# Patient Record
Sex: MALE | Race: WHITE | NOT HISPANIC OR LATINO | Employment: FULL TIME | URBAN - NONMETROPOLITAN AREA
[De-identification: names, ages, dates, MRNs, and addresses within clinical notes are randomized per-mention and may not be internally consistent; named-entity substitution may affect disease eponyms.]

---

## 2018-05-04 ENCOUNTER — HOSPITAL ENCOUNTER (OUTPATIENT)
Dept: INFUSION THERAPY | Facility: HOSPITAL | Age: 63
Discharge: HOME | End: 2018-05-04
Attending: SURGERY | Admitting: SURGERY

## 2018-05-04 ENCOUNTER — TRANSCRIBE ORDERS (OUTPATIENT)
Dept: INFUSION THERAPY | Facility: HOSPITAL | Age: 63
End: 2018-05-04

## 2018-05-04 VITALS
HEART RATE: 95 BPM | DIASTOLIC BLOOD PRESSURE: 75 MMHG | SYSTOLIC BLOOD PRESSURE: 128 MMHG | TEMPERATURE: 98.5 F | RESPIRATION RATE: 20 BRPM

## 2018-05-04 DIAGNOSIS — L03.818 CELLULITIS OF OTHER SPECIFIED SITE: Primary | ICD-10-CM

## 2018-05-04 DIAGNOSIS — L03.818 CELLULITIS OF OTHER SPECIFIED SITE: ICD-10-CM

## 2018-05-04 PROCEDURE — 25010000002 DALBAVANCIN 500 MG RECONSTITUTED SOLUTION 1 EACH VIAL: Performed by: SURGERY

## 2018-05-04 PROCEDURE — 96365 THER/PROPH/DIAG IV INF INIT: CPT

## 2018-05-04 RX ORDER — CEFDINIR 300 MG/1
300 CAPSULE ORAL 2 TIMES DAILY
Qty: 28 CAPSULE | Refills: 1 | Status: CANCELLED | OUTPATIENT
Start: 2018-05-04 | End: 2018-05-18

## 2018-05-04 RX ADMIN — DALBAVANCIN 1500 MG: 500 INJECTION, POWDER, FOR SOLUTION INTRAVENOUS at 16:02

## 2018-05-04 NOTE — PATIENT INSTRUCTIONS
Dalbavancin injection  What is this medicine?  DALBAVANCIN (DIYA ba van sin) is an antibiotic. It is used to treat certain kinds of bacterial infections. It will not work for colds, flu, or other viral infections.  This medicine may be used for other purposes; ask your health care provider or pharmacist if you have questions.  COMMON BRAND NAME(S): DALVANCE  What should I tell my health care provider before I take this medicine?  They need to know if you have any of these conditions:  -intestine problems, like colitis  -an unusual or allergic reaction to dalbavancin, other medicines, foods, dyes, or preservatives  -pregnant or trying to get pregnant  -breast-feeding  How should I use this medicine?  This medicine is infused into a vein. It is usually given by a health care professional in a hospital or clinic setting.  If you get this medicine at home, you will be taught how to prepare and give this medicine. Use exactly as directed. Take your medicine at regular intervals. Do not take your medicine more often than directed.  It is important that you put your used needles and syringes in a special sharps container. Do not put them in a trash can. If you do not have a sharps container, call your pharmacist or healthcare provider to get one.  Talk to your pediatrician regarding the use of this medicine in children. Special care may be needed.  Overdosage: If you think you have taken too much of this medicine contact a poison control center or emergency room at once.  NOTE: This medicine is only for you. Do not share this medicine with others.  What if I miss a dose?  It is important not to miss your dose. Call your doctor or health care professional if you are unable to keep an appointment. If you give yourself the medicine and you miss a dose, take it as soon as you can. If it is almost time for your next dose, take only that dose. Do not take double or extra doses.  What may interact with this medicine?  This  medicine may interact with the following medications:  -birth control pills  This list may not describe all possible interactions. Give your health care provider a list of all the medicines, herbs, non-prescription drugs, or dietary supplements you use. Also tell them if you smoke, drink alcohol, or use illegal drugs. Some items may interact with your medicine.  What should I watch for while using this medicine?  Tell your doctor or healthcare professional if your symptoms do not start to get better or if they get worse.  Do not treat diarrhea with over the counter products. Contact your doctor if you have diarrhea that lasts more than 2 days or if it is severe and watery.  What side effects may I notice from receiving this medicine?  Side effects that you should report to your doctor or health care professional as soon as possible:  -allergic reactions like skin rash, itching or hives, swelling of the face, lips, or tongue  -bloody or watery diarrhea  Side effects that usually do not require medical attention (report these to your doctor or health care professional if they continue or are bothersome):  -diarrhea  -headache  -nausea, vomiting  This list may not describe all possible side effects. Call your doctor for medical advice about side effects. You may report side effects to FDA at 2-235-FDA-1392.  Where should I keep my medicine?  Keep out of the reach of children.  This drug is usually given in a hospital or clinic and will not be stored at home. In rare cases, this medicine may be given at home.  If you are using this medicine at home, you will be instructed on how to store this medicine. Throw away any unused medicine after the expiration date on the label.  NOTE: This sheet is a summary. It may not cover all possible information. If you have questions about this medicine, talk to your doctor, pharmacist, or health care provider.  © 2015, [employer]. (2014-06-02 12:44:30)

## 2018-05-06 ENCOUNTER — ANESTHESIA (OUTPATIENT)
Dept: PERIOP | Facility: HOSPITAL | Age: 63
End: 2018-05-06

## 2018-05-06 ENCOUNTER — ANESTHESIA EVENT (OUTPATIENT)
Dept: PERIOP | Facility: HOSPITAL | Age: 63
End: 2018-05-06

## 2018-05-06 ENCOUNTER — HOSPITAL ENCOUNTER (OUTPATIENT)
Facility: HOSPITAL | Age: 63
Discharge: HOME | End: 2018-05-06
Attending: SURGERY | Admitting: SURGERY

## 2018-05-06 ENCOUNTER — TREATMENT (OUTPATIENT)
Dept: SURGERY | Facility: CLINIC | Age: 63
End: 2018-05-06

## 2018-05-06 VITALS
DIASTOLIC BLOOD PRESSURE: 84 MMHG | TEMPERATURE: 99.2 F | RESPIRATION RATE: 14 BRPM | HEART RATE: 81 BPM | SYSTOLIC BLOOD PRESSURE: 121 MMHG | OXYGEN SATURATION: 96 %

## 2018-05-06 DIAGNOSIS — L03.116 CELLULITIS OF LEFT LOWER EXTREMITY: Primary | ICD-10-CM

## 2018-05-06 PROBLEM — L03.032 CELLULITIS OF GREAT TOE, LEFT: Status: ACTIVE | Noted: 2018-05-06

## 2018-05-06 PROCEDURE — 11042 DBRDMT SUBQ TIS 1ST 20SQCM/<: CPT | Performed by: SURGERY

## 2018-05-06 RX ORDER — MAGNESIUM HYDROXIDE 1200 MG/15ML
LIQUID ORAL AS NEEDED
Status: DISCONTINUED | OUTPATIENT
Start: 2018-05-06 | End: 2018-05-06 | Stop reason: HOSPADM

## 2018-05-06 NOTE — ANESTHESIA PROCEDURE NOTES
Peripheral Block    Patient location during procedure: pre-op  Start time: 5/6/2018 4:40 PM  Stop time: 5/6/2018 4:55 PM  Reason for block: procedure for pain and at surgeon's request  Performed by  Anesthesiologist: LYSSA ALLISON  CRNA: TIKA FROST  Preanesthetic Checklist  Completed: patient identified, site marked, surgical consent, pre-op evaluation, timeout performed, IV checked, risks and benefits discussed and monitors and equipment checked  Prep:  Pt Position: supine  Sterile barriers:cap, gloves and sterile barriers  Prep: ChloraPrep  Patient monitoring: blood pressure monitoring, continuous pulse oximetry and EKG  Procedure  Sedation:no  Performed under: MAC  Guidance:landmark technique    Laterality:left  Block Type:ankle  Injection Technique:single-shotNeedle Gauge:27 G  Resistance on Injection: less than 15 psi  Medications  Local Injected:bupivacaine 0.5% and lidocaine 2% Local Amount Injected:30mL  Post Assessment  Injection Assessment: negative aspiration for heme, no paresthesia on injection and incremental injection  Patient Tolerance:comfortable throughout block  Complications:no

## 2018-05-06 NOTE — ANESTHESIA POSTPROCEDURE EVALUATION
Patient: Uriah Torres    Procedure Summary     Date:  05/06/18 Room / Location:  [facility] /  [facility]    Anesthesia Start:  1712 Anesthesia Stop:  1727    Procedure:  I&D LEFT GREAT TOE (Left Arm Upper) Diagnosis:       Cellulitis of left lower extremity      (Cellulitis of left lower extremity [L03.116])    Surgeon:  Juancarlos Cortez MD Provider:  Parviz Berkowitz DO    Anesthesia Type:  MAC, regional ASA Status:  2          Anesthesia Type: MAC, regional  Last vitals  BP   128/88 (05/06/18 1630)   Temp   97.1 °F (36.2 °C) (05/06/18 1630)   Pulse   79 (05/06/18 1630)   Resp   20 (05/06/18 1630)     SpO2   95 % (05/06/18 1630)     Anesthesia Post Evaluation

## 2018-05-06 NOTE — OP NOTE
Uriah Engels  5/6/2018      Operative Progress Note:    Surgeon and Assistant: Dr. Cortez    Pre-Operative Diagnosis: Contusion of left giant toe with damage to nail    Post-Operative Diagnosis: Contusion of left giant toe with damage to nail    Procedure(s):  debridement left giant toe    Type of Anesthesia Administered: ankle block per anesthesia    Estimated Blood Loss: Minimal    Blood Products: None    Specimen Obtained/Removed:None    Complication(s):  None    Graft/Implant/Prosthetics/Implanted Device/Transplants: None    Indication: patient is a 63-year-old white    Findings: small amount purulence underneath the nail bed    Operative Report:  Patient taken operating room laid no spine position on operating table.  Ankle block was performed by anesthesia in the holding room.  Left foot was prepped draped usual sterile fashion.  I elevated the nail fold proximally there is a minimal amount of purulence.  I debrided some dried skin in the area.  There is no evidence of fluctuance.  Sterile dressing was applied.  Procedure terminated.  Patient thought procedure very well was returned to the recovery room in satisfactory condition.    Patient will be discharged home at recovery and will be seen back in the office in one week.       Electronically Signed by: Juancarlos Cortez MD        Dictated Utilizing Dragon Dictation

## 2018-05-06 NOTE — ANESTHESIA POSTPROCEDURE EVALUATION
Patient: Uriah Torres    Procedure Summary     Date:  05/06/18 Room / Location:  [facility] OR 01 / [facility]    Anesthesia Start:  1712 Anesthesia Stop:  1727    Procedure:  I&D LEFT GREAT TOE (Left Arm Upper) Diagnosis:       Cellulitis of left lower extremity      (Cellulitis of left lower extremity [L03.116])    Surgeon:  Juancarlos Cortez MD Provider:  Parviz Berkowitz DO    Anesthesia Type:  MAC, regional ASA Status:  2          Anesthesia Type: MAC, regional  Last vitals  BP   128/88 (05/06/18 1630)   Temp   97.1 °F (36.2 °C) (05/06/18 1630)   Pulse   79 (05/06/18 1630)   Resp   20 (05/06/18 1630)     SpO2   95 % (05/06/18 1630)     Post Anesthesia Care and Evaluation    Patient location during evaluation: PACU  Patient participation: complete - patient participated  Level of consciousness: awake and alert  Pain score: 0  Pain management: adequate  Airway patency: patent  Anesthetic complications: No anesthetic complications  PONV Status: none  Cardiovascular status: acceptable and stable  Respiratory status: acceptable  Hydration status: acceptable  No anesthesia care post op

## 2018-05-06 NOTE — ANESTHESIA PREPROCEDURE EVALUATION
Anesthesia Evaluation     Patient summary reviewed and Nursing notes reviewed   no history of anesthetic complications:  NPO Solid Status: > 4 hours  NPO Liquid Status: > 4 hours           Airway   Mallampati: II  TM distance: >3 FB  Neck ROM: full  no difficulty expected  Dental - normal exam     Pulmonary - negative pulmonary ROS and normal exam    breath sounds clear to auscultation  Cardiovascular - negative cardio ROS and normal exam  Exercise tolerance: excellent (>7 METS)    Rhythm: regular  Rate: normal        Neuro/Psych- negative ROS  GI/Hepatic/Renal/Endo - negative ROS     Musculoskeletal     (+) joint swelling,   Abdominal  - normal exam    Bowel sounds: normal.   Substance History - negative use     OB/GYN negative ob/gyn ROS         Other                        Anesthesia Plan    ASA 2     MAC and regional   (Ankle Block)  Anesthetic plan and risks discussed with patient.    Plan discussed with CRNA.

## 2018-05-06 NOTE — PLAN OF CARE
Problem: Infection, Risk/Actual (Adult)  Goal: Identify Related Risk Factors and Signs and Symptoms  Outcome: Ongoing (interventions implemented as appropriate)      Problem: Pain, Acute (Adult)  Goal: Identify Related Risk Factors and Signs and Symptoms  Outcome: Ongoing (interventions implemented as appropriate)

## 2018-05-06 NOTE — PROGRESS NOTES
Chief complaint: infected left giant toe    HPI: patient's a 63-year-old white male who developed paronychia of his left first toe.  2 days ago I removed the toenail.  He has some persistent fluctuance in this area        Review of Systems:    Constitutional: denies any weight changes, fatigue or weakness.  Eyes: : denies blurred or double vision  Cardiovascular: denies chest pain, palpitations, edemas.  Respiratory: denies cough, sputum, SOB.  Gastrointestinal: denies N&V, abd pain, diarrhea, constipation.  Genitourinary: denies dysuria, frequency.  Endocrine: denies cold intolerance, lethargy and flushing.  Hem: denies excessive bruising and postop bleeding.  Musculoskeletal: denies weakness, joint swelling, pain or stiffness.  Neuro: denies seizures, CVA, paresthesia, or peripheral neuropathy.   Skin: denies change in nevi, rashes, masses.      History  Past Medical History:   Diagnosis Date   • Lactose intolerance in adult      Past Surgical History:   Procedure Laterality Date   • APPENDECTOMY     • COLONOSCOPY       No family history on file.  Social History   Substance Use Topics   • Smoking status: Never Smoker   • Smokeless tobacco: Never Used   • Alcohol use No       (Not in a hospital admission)  Allergies:  Review of patient's allergies indicates no known allergies.    Objective     Vital Signs       Physical Exam:      General Appearance:    Alert, cooperative, in no acute distress   Head:    Normocephalic, without obvious abnormality, atraumatic   Eyes:            Lids and lashes normal, conjunctivae and sclerae normal, no   icterus, no pallor, corneas clear, PERRLA   Ears:    Ears appear intact with no abnormalities noted   Throat:   No oral lesions, no thrush, oral mucosa moist   Neck:   No adenopathy, supple, trachea midline, no thyromegaly, no   carotid bruit, no JVD   Back:     No kyphosis present, no scoliosis present, no skin lesions,      erythema or scars, no tenderness to percussion or                    palpation,   range of motion normal   Lungs:     Clear to auscultation,respirations regular, even and                  unlabored    Heart:    Regular rhythm and normal rate, normal S1 and S2, no            murmur, no gallop, no rub, no click   Chest Wall:    No abnormalities observed   Abdomen:    Solved    Rectal:     Deferred   Extremities: Left giant toe appears to have some fluctuance just proximal to the nail bed.  Nail has been removed   Pulses:   Pulses palpable and equal bilaterally   Skin:   No bleeding, bruising or rash   Lymph nodes:   No palpable adenopathy   Neurologic:   Cranial nerves 2 - 12 grossly intact, sensation intact, DTR       present and equal bilaterally       No results found for: GLUCOSE, BUN, CREATININE, EGFRIFNONA, EGFRIFAFRI, BCR, CO2, CALCIUM, PROTENTOTREF, ALBUMIN, LABIL2, AST, ALT  No results found for: WBC, RBC, HGB, HCT, MCV, MCH, MCHC, RDW, RDWSD, MPV, PLT, NEUTRORELPCT, LYMPHORELPCT, MONORELPCT, EOSRELPCT, BASORELPCT, AUTOIGPER, NEUTROABS, LYMPHSABS, MONOSABS, EOSABS, BASOSABS, AUTOIGNUM, NRBC    Imaging Results (last 72 hours)     ** No results found for the last 72 hours. **             Assessment  Abscess left giant toe   Plan  Incision and drainage           Juancarlos Cortez MD  05/06/18  4:07 PM      Dragon disclaimer:  Much of this encounter note is an electronic transcription/translation of spoken language to printed text. The electronic translation of spoken language may permit erroneous, or at times, nonsensical words or phrases to be inadvertently transcribed; Although I have reviewed the note for such errors, some may still exist.    This document signed by Juancarlos Cortez MD May 6, 2018 4:07 PM

## 2018-05-11 NOTE — H&P
Patient:    Uriah Torres  :  1955    Chief complaint: infected left giant toe     HPI: patient's a 63-year-old white male who developed paronychia of his left first toe.  2 days ago I removed the toenail.  He has some persistent fluctuance in this area           Review of Systems:     Constitutional: denies any weight changes, fatigue or weakness.  Eyes: : denies blurred or double vision  Cardiovascular: denies chest pain, palpitations, edemas.  Respiratory: denies cough, sputum, SOB.  Gastrointestinal: denies N&V, abd pain, diarrhea, constipation.  Genitourinary: denies dysuria, frequency.  Endocrine: denies cold intolerance, lethargy and flushing.  Hem: denies excessive bruising and postop bleeding.  Musculoskeletal: denies weakness, joint swelling, pain or stiffness.  Neuro: denies seizures, CVA, paresthesia, or peripheral neuropathy.   Skin: denies change in nevi, rashes, masses.        History  Medical History        Past Medical History:   Diagnosis Date   • Lactose intolerance in adult           Surgical History         Past Surgical History:   Procedure Laterality Date   • APPENDECTOMY       • COLONOSCOPY             No family history on file.       Social History   Substance Use Topics   • Smoking status: Never Smoker   • Smokeless tobacco: Never Used   • Alcohol use No        Prescriptions Prior to Admission      (Not in a hospital admission)     Allergies:  Review of patient's allergies indicates no known allergies.        Objective         Vital Signs     Physical Exam:                 General Appearance:    Alert, cooperative, in no acute distress   Head:    Normocephalic, without obvious abnormality, atraumatic   Eyes:            Lids and lashes normal, conjunctivae and sclerae normal, no   icterus, no pallor, corneas clear, PERRLA   Ears:    Ears appear intact with no abnormalities noted   Throat:   No oral lesions, no thrush, oral mucosa moist   Neck:   No adenopathy, supple, trachea  midline, no thyromegaly, no   carotid bruit, no JVD   Back:     No kyphosis present, no scoliosis present, no skin lesions,      erythema or scars, no tenderness to percussion or                   palpation,   range of motion normal   Lungs:     Clear to auscultation,respirations regular, even and                  unlabored    Heart:    Regular rhythm and normal rate, normal S1 and S2, no            murmur, no gallop, no rub, no click   Chest Wall:    No abnormalities observed   Abdomen:    Solved    Rectal:     Deferred   Extremities: Left giant toe appears to have some fluctuance just proximal to the nail bed.  Nail has been removed   Pulses:   Pulses palpable and equal bilaterally   Skin:   No bleeding, bruising or rash   Lymph nodes:   No palpable adenopathy   Neurologic:   Cranial nerves 2 - 12 grossly intact, sensation intact, DTR       present and equal bilaterally         No results found for: GLUCOSE, BUN, CREATININE, EGFRIFNONA, EGFRIFAFRI, BCR, CO2, CALCIUM, PROTENTOTREF, ALBUMIN, LABIL2, AST, ALT  No results found for: WBC, RBC, HGB, HCT, MCV, MCH, MCHC, RDW, RDWSD, MPV, PLT, NEUTRORELPCT, LYMPHORELPCT, MONORELPCT, EOSRELPCT, BASORELPCT, AUTOIGPER, NEUTROABS, LYMPHSABS, MONOSABS, EOSABS, BASOSABS, AUTOIGNUM, NRBC         Imaging Results (last 72 hours)      ** No results found for the last 72 hours. **                           Assessment  Abscess left giant toe   Plan  Incision and drainage

## 2018-05-15 ENCOUNTER — LAB (OUTPATIENT)
Dept: LAB | Facility: HOSPITAL | Age: 63
End: 2018-05-15
Attending: SURGERY

## 2018-05-15 DIAGNOSIS — Z51.89 VISIT FOR WOUND CHECK: Primary | ICD-10-CM

## 2018-05-15 DIAGNOSIS — Z51.89 VISIT FOR WOUND CHECK: ICD-10-CM

## 2018-05-15 DIAGNOSIS — L03.116 CELLULITIS OF LEFT LOWER EXTREMITY: ICD-10-CM

## 2018-05-15 PROCEDURE — 85007 BL SMEAR W/DIFF WBC COUNT: CPT | Performed by: SURGERY

## 2018-05-15 PROCEDURE — 36415 COLL VENOUS BLD VENIPUNCTURE: CPT

## 2018-05-15 PROCEDURE — 80053 COMPREHEN METABOLIC PANEL: CPT | Performed by: SURGERY

## 2018-05-15 PROCEDURE — 85025 COMPLETE CBC W/AUTO DIFF WBC: CPT | Performed by: SURGERY

## 2018-05-15 PROCEDURE — 86140 C-REACTIVE PROTEIN: CPT | Performed by: SURGERY

## 2018-05-16 LAB
ALBUMIN SERPL-MCNC: 4.4 G/DL (ref 3.4–4.8)
ALBUMIN/GLOB SERPL: 1.4 G/DL (ref 1.5–2.5)
ALP SERPL-CCNC: 79 U/L (ref 40–129)
ALT SERPL W P-5'-P-CCNC: 37 U/L (ref 10–44)
ANION GAP SERPL CALCULATED.3IONS-SCNC: 9.5 MMOL/L (ref 3.6–11.2)
AST SERPL-CCNC: 31 U/L (ref 10–34)
BILIRUB SERPL-MCNC: 0.5 MG/DL (ref 0.2–1.8)
BUN BLD-MCNC: 18 MG/DL (ref 7–21)
BUN/CREAT SERPL: 15.9 (ref 7–25)
BURR CELLS BLD QL SMEAR: NORMAL
CALCIUM SPEC-SCNC: 8.9 MG/DL (ref 7.7–10)
CHLORIDE SERPL-SCNC: 106 MMOL/L (ref 99–112)
CO2 SERPL-SCNC: 21.5 MMOL/L (ref 24.3–31.9)
CREAT BLD-MCNC: 1.13 MG/DL (ref 0.43–1.29)
CRP SERPL-MCNC: 0.77 MG/DL (ref 0–0.99)
DEPRECATED RDW RBC AUTO: 40.4 FL (ref 37–54)
EOSINOPHIL # BLD MANUAL: 0.06 10*3/MM3 (ref 0–0.7)
EOSINOPHIL NFR BLD MANUAL: 1 % (ref 0–5)
ERYTHROCYTE [DISTWIDTH] IN BLOOD BY AUTOMATED COUNT: 14.6 % (ref 11.5–14.5)
GFR SERPL CREATININE-BSD FRML MDRD: 66 ML/MIN/1.73
GLOBULIN UR ELPH-MCNC: 3.2 GM/DL
GLUCOSE BLD-MCNC: 156 MG/DL (ref 70–110)
HCT VFR BLD AUTO: 44.1 % (ref 42–52)
HGB BLD-MCNC: 14.2 G/DL (ref 14–18)
LARGE PLATELETS: NORMAL
LYMPHOCYTES # BLD MANUAL: 2.06 10*3/MM3 (ref 1–3)
LYMPHOCYTES NFR BLD MANUAL: 34 % (ref 21–51)
LYMPHOCYTES NFR BLD MANUAL: 8 % (ref 0–10)
MCH RBC QN AUTO: 25.1 PG (ref 27–33)
MCHC RBC AUTO-ENTMCNC: 32.2 G/DL (ref 33–37)
MCV RBC AUTO: 77.9 FL (ref 80–94)
MONOCYTES # BLD AUTO: 0.48 10*3/MM3 (ref 0.1–0.9)
NEUTROPHILS # BLD AUTO: 3.45 10*3/MM3 (ref 1.4–6.5)
NEUTROPHILS NFR BLD MANUAL: 57 % (ref 30–70)
OSMOLALITY SERPL CALC.SUM OF ELEC: 278.9 MOSM/KG (ref 273–305)
PLATELET # BLD AUTO: 206 10*3/MM3 (ref 130–400)
PMV BLD AUTO: 10.6 FL (ref 6–10)
POIKILOCYTOSIS BLD QL SMEAR: NORMAL
POTASSIUM BLD-SCNC: 3.8 MMOL/L (ref 3.5–5.3)
PROT SERPL-MCNC: 7.6 G/DL (ref 6–8)
RBC # BLD AUTO: 5.66 10*6/MM3 (ref 4.7–6.1)
SCAN SLIDE: NORMAL
SODIUM BLD-SCNC: 137 MMOL/L (ref 135–153)
WBC NRBC COR # BLD: 6.06 10*3/MM3 (ref 4.5–12.5)

## 2018-08-28 PROCEDURE — 36415 COLL VENOUS BLD VENIPUNCTURE: CPT

## 2018-08-28 PROCEDURE — 87522 HEPATITIS C REVRS TRNSCRPJ: CPT

## 2018-08-29 ENCOUNTER — LAB (OUTPATIENT)
Dept: LAB | Facility: HOSPITAL | Age: 63
End: 2018-08-29

## 2018-08-29 ENCOUNTER — TRANSCRIBE ORDERS (OUTPATIENT)
Dept: ADMINISTRATIVE | Facility: HOSPITAL | Age: 63
End: 2018-08-29

## 2018-08-29 DIAGNOSIS — IMO0002 NEEDLE STICK INJURY: ICD-10-CM

## 2018-08-29 DIAGNOSIS — IMO0002 NEEDLE STICK INJURY: Primary | ICD-10-CM

## 2018-08-29 LAB
ALT SERPL W P-5'-P-CCNC: 31 U/L (ref 10–44)
AST SERPL-CCNC: 29 U/L (ref 10–34)
HCV AB SER DONR QL: NORMAL

## 2018-08-29 PROCEDURE — 84460 ALANINE AMINO (ALT) (SGPT): CPT

## 2018-08-29 PROCEDURE — 86803 HEPATITIS C AB TEST: CPT

## 2018-08-29 PROCEDURE — 84450 TRANSFERASE (AST) (SGOT): CPT

## 2018-08-29 PROCEDURE — 36415 COLL VENOUS BLD VENIPUNCTURE: CPT

## 2018-09-03 LAB
HCV RNA SERPL NAA+PROBE-ACNC: NORMAL IU/ML
TEST INFORMATION: NORMAL

## 2018-09-21 ENCOUNTER — LAB REQUISITION (OUTPATIENT)
Dept: LAB | Facility: HOSPITAL | Age: 63
End: 2018-09-21

## 2018-09-21 DIAGNOSIS — Z00.00 ROUTINE GENERAL MEDICAL EXAMINATION AT A HEALTH CARE FACILITY: ICD-10-CM

## 2018-09-21 LAB
ALT SERPL W P-5'-P-CCNC: 29 U/L (ref 10–44)
HBV SURFACE AG SERPL QL IA: NORMAL
HCV AB SER DONR QL: NORMAL
HIV1+2 AB SER QL: NORMAL

## 2018-09-21 PROCEDURE — G0432 EIA HIV-1/HIV-2 SCREEN: HCPCS | Performed by: FAMILY MEDICINE

## 2018-09-21 PROCEDURE — 86803 HEPATITIS C AB TEST: CPT | Performed by: FAMILY MEDICINE

## 2018-09-21 PROCEDURE — 86706 HEP B SURFACE ANTIBODY: CPT | Performed by: FAMILY MEDICINE

## 2018-09-21 PROCEDURE — 87340 HEPATITIS B SURFACE AG IA: CPT | Performed by: FAMILY MEDICINE

## 2018-09-21 PROCEDURE — 84460 ALANINE AMINO (ALT) (SGPT): CPT | Performed by: FAMILY MEDICINE

## 2018-09-22 LAB — HBV SURFACE AB SER RIA-ACNC: NORMAL

## 2018-11-01 ENCOUNTER — LAB REQUISITION (OUTPATIENT)
Dept: LAB | Facility: HOSPITAL | Age: 63
End: 2018-11-01

## 2018-11-01 DIAGNOSIS — Z00.00 ROUTINE GENERAL MEDICAL EXAMINATION AT A HEALTH CARE FACILITY: ICD-10-CM

## 2018-11-01 LAB — HIV1+2 AB SER QL: NORMAL

## 2018-11-01 PROCEDURE — G0432 EIA HIV-1/HIV-2 SCREEN: HCPCS | Performed by: NURSE PRACTITIONER

## 2019-01-23 ENCOUNTER — HOSPITAL ENCOUNTER (OUTPATIENT)
Dept: GENERAL RADIOLOGY | Facility: HOSPITAL | Age: 64
Discharge: HOME | End: 2019-01-23
Attending: ORTHOPAEDIC SURGERY | Admitting: ORTHOPAEDIC SURGERY

## 2019-01-23 ENCOUNTER — OFFICE VISIT (OUTPATIENT)
Dept: ORTHOPEDIC SURGERY | Facility: CLINIC | Age: 64
End: 2019-01-23

## 2019-01-23 VITALS — WEIGHT: 260 LBS | BODY MASS INDEX: 34.46 KG/M2 | HEIGHT: 73 IN

## 2019-01-23 DIAGNOSIS — M25.561 ACUTE PAIN OF RIGHT KNEE: ICD-10-CM

## 2019-01-23 DIAGNOSIS — S83.241A TEAR OF MEDIAL MENISCUS OF RIGHT KNEE, CURRENT, UNSPECIFIED TEAR TYPE, INITIAL ENCOUNTER: Primary | ICD-10-CM

## 2019-01-23 DIAGNOSIS — M25.461 EFFUSION OF KNEE JOINT RIGHT: ICD-10-CM

## 2019-01-23 DIAGNOSIS — S83.281A TEAR OF LATERAL MENISCUS OF RIGHT KNEE, CURRENT, UNSPECIFIED TEAR TYPE, INITIAL ENCOUNTER: ICD-10-CM

## 2019-01-23 DIAGNOSIS — M25.561 RIGHT KNEE PAIN, UNSPECIFIED CHRONICITY: ICD-10-CM

## 2019-01-23 PROCEDURE — 20610 DRAIN/INJ JOINT/BURSA W/O US: CPT | Performed by: ORTHOPAEDIC SURGERY

## 2019-01-23 PROCEDURE — 73562 X-RAY EXAM OF KNEE 3: CPT | Performed by: RADIOLOGY

## 2019-01-23 PROCEDURE — 73562 X-RAY EXAM OF KNEE 3: CPT

## 2019-01-23 PROCEDURE — 99203 OFFICE O/P NEW LOW 30 MIN: CPT | Performed by: ORTHOPAEDIC SURGERY

## 2019-01-23 RX ADMIN — LIDOCAINE HYDROCHLORIDE 4 ML: 10 INJECTION, SOLUTION INFILTRATION; PERINEURAL at 09:00

## 2019-01-23 RX ADMIN — METHYLPREDNISOLONE ACETATE 40 MG: 40 INJECTION, SUSPENSION INTRA-ARTICULAR; INTRALESIONAL; INTRAMUSCULAR; SOFT TISSUE at 09:00

## 2019-01-23 NOTE — PROGRESS NOTES
New Patient Visit      Patient: Uriah Torres  YOB: 1955  Date of Encounter: 01/23/2019      Chief Complaint:   Chief Complaint   Patient presents with   • Right Knee - Pain       HPI:   Uriah Torres, 63 y.o. male, presents for evaluation of right knee pain now of about 4 months duration.  He is struggling with pain, has discomfort with weightbearing but also discomfort at rest.  He has experienced catching sensation in his knee but has not actually fallen.  He denies trauma recent or remote.  He complains of generalized swelling about his knee with stiffness as the day progresses.  He has completed MRI of right knee.  He denies weakness or numbness in his right leg.    Active Problem List:  Patient Active Problem List   Diagnosis   • Cellulitis of left lower extremity   • Cellulitis of great toe, left   • Tear of medial meniscus of right knee, current   • Tear of lateral meniscus of right knee, current   • Acute pain of right knee   • Effusion of knee joint right       Past Medical History:  Past Medical History:   Diagnosis Date   • Lactose intolerance in adult        Past Surgical History:  Past Surgical History:   Procedure Laterality Date   • APPENDECTOMY     • APPENDECTOMY     • COLONOSCOPY     • LEG DEBRIDEMENT Left 5/6/2018    Procedure: DEBRIDEMENT LEFT GIANT TOE;  Surgeon: Juancarlos Cortez MD;  Location: [facility];  Service: General       Family History:  Family History   Problem Relation Age of Onset   • Cancer Mother    • Diabetes Father    • Heart disease Brother        Social History:  Social History     Socioeconomic History   • Marital status:      Spouse name: Not on file   • Number of children: Not on file   • Years of education: Not on file   • Highest education level: Not on file   Social Needs   • Financial resource strain: Not on file   • Food insecurity - worry: Not on file   • Food insecurity - inability: Not on file   • Transportation needs - medical: Not on file   •  "Transportation needs - non-medical: Not on file   Occupational History   • Not on file   Tobacco Use   • Smoking status: Never Smoker   • Smokeless tobacco: Never Used   Substance and Sexual Activity   • Alcohol use: No   • Drug use: No   • Sexual activity: Defer   Other Topics Concern   • Not on file   Social History Narrative   • Not on file     Patient's Body mass index is 34.3 kg/m². BMI is above normal parameters. Recommendations include: educational material.      Medications:  No current outpatient medications on file.     No current facility-administered medications for this visit.        Allergies:  No Known Allergies    Review of Systems:   Review of Systems   Constitutional: Positive for activity change.   HENT: Negative.    Eyes: Negative.    Respiratory: Negative.    Cardiovascular: Negative.    Gastrointestinal: Negative.    Endocrine: Negative.    Genitourinary: Negative.    Musculoskeletal: Positive for arthralgias, gait problem and joint swelling.   Skin: Negative.    Allergic/Immunologic: Negative.    Hematological: Negative.    Psychiatric/Behavioral: Negative.        Physical Exam:   Physical Exam  GENERAL: 63 y.o. male, alert and oriented X 3 in no acute distress.   Visit Vitals  Ht 185.4 cm (73\")   Wt 118 kg (260 lb)   BMI 34.30 kg/m²       Musculoskeletal:   Right knee evaluation reveals moderate effusion with marked medial joint line tenderness, mild lateral joint line tenderness, no gross instability with varus valgus stressing, he has discomfort with full extension and discomfort with attempted flexion beyond 90°, patella with normal tracking and no crepitance.  Neurovascular grossly intact.    Radiology/Labs:    Radiographs obtained today AP lateral right knee show very slight medial joint space narrowing slight squaring of the medial femoral condyle.  Lateral compartment without significant joint space loss.  Moderate effusion present.      MRI reviewed today, there is tear involving the " posterior horn of the medial meniscus.  There are small osteophytes off the medial femoral condyle and tibial plateau.  There is also abnormal signal within the lateral meniscus with horizontal tear involving the anterior horn.      Assessment & Plan:   63 y.o. male with very early degenerative arthritis right knee MRI revealing osteoarthritic changes but obvious tear involving the posterior horn of the medial meniscus and possibly a symptomatic tear involving the anterior horn of the lateral meniscus.  He will eventually require arthroscopic partial medial meniscectomy.  As a temporary measure today he is treated with aspiration removing 20 cc of serous fluid and injecting Depo-Medrol 40 mg intra-articular.  He will follow-up in the future depending on his response to steroid injection.  He is encouraged to return if no improvement      ICD-10-CM ICD-9-CM   1. Tear of medial meniscus of right knee, current, unspecified tear type, initial encounter S83.241A 836.0   2. Right knee pain, unspecified chronicity M25.561 719.46   3. Tear of lateral meniscus of right knee, current, unspecified tear type, initial encounter S83.281A 836.1   4. Acute pain of right knee M25.561 719.46   5. Effusion of knee joint right M25.461 719.06     Large Joint Arthrocentesis: R knee  Date/Time: 1/23/2019 9:00 AM  Consent given by: patient  Site marked: site marked  Supporting Documentation  Indications: pain and joint swelling   Procedure Details  Location: knee - R knee  Preparation: Patient was prepped and draped in the usual sterile fashion  Needle size: 18 G  Approach: anterolateral  Medications administered: 40 mg methylPREDNISolone acetate 40 MG/ML; 4 mL lidocaine 1 %  Aspirate amount: 20 mL  Aspirate: serous  Patient tolerance: patient tolerated the procedure well with no immediate complications            Scribed for Jimmy Zhu MD by Brionna Zhu RN.8:52 AM 01/23/2019

## 2019-01-28 RX ORDER — LIDOCAINE HYDROCHLORIDE 10 MG/ML
4 INJECTION, SOLUTION INFILTRATION; PERINEURAL
Status: COMPLETED | OUTPATIENT
Start: 2019-01-23 | End: 2019-01-23

## 2019-01-28 RX ORDER — METHYLPREDNISOLONE ACETATE 40 MG/ML
40 INJECTION, SUSPENSION INTRA-ARTICULAR; INTRALESIONAL; INTRAMUSCULAR; SOFT TISSUE
Status: COMPLETED | OUTPATIENT
Start: 2019-01-23 | End: 2019-01-23

## 2019-01-30 ENCOUNTER — OFFICE VISIT (OUTPATIENT)
Dept: ORTHOPEDIC SURGERY | Facility: CLINIC | Age: 64
End: 2019-01-30

## 2019-01-30 DIAGNOSIS — S83.282D ACUTE LATERAL MENISCUS TEAR OF LEFT KNEE, SUBSEQUENT ENCOUNTER: ICD-10-CM

## 2019-01-30 DIAGNOSIS — S83.242D ACUTE MEDIAL MENISCUS TEAR, LEFT, SUBSEQUENT ENCOUNTER: Primary | ICD-10-CM

## 2019-01-30 PROCEDURE — 99214 OFFICE O/P EST MOD 30 MIN: CPT | Performed by: ORTHOPAEDIC SURGERY

## 2019-01-31 PROCEDURE — 80048 BASIC METABOLIC PNL TOTAL CA: CPT | Performed by: ORTHOPAEDIC SURGERY

## 2019-01-31 PROCEDURE — 85027 COMPLETE CBC AUTOMATED: CPT | Performed by: ORTHOPAEDIC SURGERY

## 2019-02-02 VITALS
SYSTOLIC BLOOD PRESSURE: 118 MMHG | DIASTOLIC BLOOD PRESSURE: 76 MMHG | HEART RATE: 74 BPM | WEIGHT: 260.14 LBS | BODY MASS INDEX: 34.48 KG/M2 | HEIGHT: 73 IN

## 2019-02-02 PROBLEM — L03.032 CELLULITIS OF GREAT TOE, LEFT: Status: RESOLVED | Noted: 2018-05-06 | Resolved: 2019-02-02

## 2019-02-02 PROBLEM — L03.116 CELLULITIS OF LEFT LOWER EXTREMITY: Status: RESOLVED | Noted: 2018-05-06 | Resolved: 2019-02-02

## 2019-02-02 RX ORDER — HEPATITIS A VACCINE 1440 [IU]/ML
INJECTION, SUSPENSION INTRAMUSCULAR
Refills: 0 | COMMUNITY
Start: 2018-12-19

## 2019-02-03 ENCOUNTER — PREP FOR SURGERY (OUTPATIENT)
Dept: OTHER | Facility: HOSPITAL | Age: 64
End: 2019-02-03

## 2019-02-03 NOTE — H&P (VIEW-ONLY)
History and Physical      Patient: Uriah Torres  YOB: 1955  Date of Encounter: 01/30/2019      Chief Complaint:   Chief Complaint   Patient presents with   • Right Knee - Pain, Follow-up       HPI:   Uriah Torres, 63 y.o. male, seen today in follow-up right knee pain.  Symptoms began around the 4 months ago.  He continues to experience a catching sensation in his knee which makes it very difficult for him to walk.  He noticed improvement in his knee after steroid injection but within 4 days his pain has returned.  He limps especially toward the end of the day.  He does not experience true locking of his knee.  He has moderate pain at night describes a throbbing pain which prevents him from sleeping.  He has had MRI completed revealing medial meniscus tear and anterior horn lateral meniscus tears.  He has very mild degenerative changes present.    Active Problem List:  Patient Active Problem List   Diagnosis   • Tear of medial meniscus of right knee, current   • Tear of lateral meniscus of right knee, current   • Acute pain of right knee   • Effusion of knee joint right       Past Medical History:  Past Medical History:   Diagnosis Date   • Lactose intolerance in adult        Past Surgical History:  Past Surgical History:   Procedure Laterality Date   • APPENDECTOMY     • APPENDECTOMY     • COLONOSCOPY     • LEG DEBRIDEMENT Left 5/6/2018    Procedure: DEBRIDEMENT LEFT GIANT TOE;  Surgeon: Juancarlos Cortez MD;  Location: [facility];  Service: General       Family History:  Family History   Problem Relation Age of Onset   • Cancer Mother    • Diabetes Father    • Heart disease Brother        Social History:  Social History     Socioeconomic History   • Marital status:      Spouse name: Not on file   • Number of children: Not on file   • Years of education: Not on file   • Highest education level: Not on file   Social Needs   • Financial resource strain: Not on file   • Food insecurity - worry: Not  on file   • Food insecurity - inability: Not on file   • Transportation needs - medical: Not on file   • Transportation needs - non-medical: Not on file   Occupational History   • Not on file   Tobacco Use   • Smoking status: Never Smoker   • Smokeless tobacco: Never Used   Substance and Sexual Activity   • Alcohol use: No   • Drug use: No   • Sexual activity: Defer   Other Topics Concern   • Not on file   Social History Narrative   • Not on file     Patient's Body mass index is 34.33 kg/m². BMI is above normal parameters. Recommendations include: educational material.      Medications:  Current Outpatient Medications   Medication Sig Dispense Refill   • ibuprofen (ADVIL,MOTRIN) 100 MG/5ML suspension Take  by mouth Every 6 (Six) Hours As Needed for Mild Pain .     • HAVRIX 1440 EL U/ML vaccine inject 1 milliliter intramuscularly  0     No current facility-administered medications for this visit.        Allergies:  Allergies   Allergen Reactions   • Ciprofloxacin Rash       Review of Systems:   Review of Systems   Constitutional: Positive for activity change.   HENT: Negative.    Eyes: Negative.    Respiratory: Negative.    Cardiovascular: Negative.    Gastrointestinal: Negative.    Endocrine: Negative.    Genitourinary: Negative.    Musculoskeletal: Positive for arthralgias, gait problem and joint swelling.   Skin: Negative.    Allergic/Immunologic: Negative.    Hematological: Negative.    Psychiatric/Behavioral: Negative.        Physical Exam:   Physical Exam   Constitutional: He is oriented to person, place, and time. He appears well-developed. No distress.   HENT:   Head: Normocephalic and atraumatic.   Right Ear: External ear normal.   Left Ear: External ear normal.   Nose: Nose normal.   Eyes: Conjunctivae and EOM are normal. Right eye exhibits no discharge. Left eye exhibits no discharge.   Neck: Normal range of motion. Neck supple.   Cardiovascular: Normal rate, regular rhythm and normal heart sounds.   No  "murmur heard.  Pulmonary/Chest: Effort normal and breath sounds normal. No respiratory distress. He has no wheezes.   Abdominal: Soft. He exhibits no distension.   Musculoskeletal:     Right knee evaluation reveals moderate effusion, marked medial joint line tenderness, moderate lateral joint line tenderness positive Divine.  There is no gross instability with varus valgus stressing Lachman or drawer.  Neurovascular examination grossly intact.    Neurological: He is alert and oriented to person, place, and time.   Skin: Capillary refill takes less than 2 seconds. He is not diaphoretic. No erythema.   Psychiatric: He has a normal mood and affect. His behavior is normal. Judgment and thought content normal.     GENERAL: 63 y.o. male, alert and oriented X 3 in no acute distress.   Visit Vitals  /76   Pulse 74   Ht 185.4 cm (72.99\")   Wt 118 kg (260 lb 2.3 oz)   BMI 34.33 kg/m²       Radiology/Labs:    Radiographs right knee from previous visit show very mild squaring of the medial femoral condyle otherwise negative.      MRI again reviewed, there is horizontal tear involving the posterior horn the medial meniscus and also horizontal tear involving the anterior horn of the lateral meniscus.  Very mild degenerative changes noted.      Assessment & Plan:   63 y.o. male with continued right knee pain despite rest and intra-articular steroid injection.  Based on MRI showing obvious medial meniscus tear and questionable lateral meniscus tear, he is recommended arthroscopic partial medial meniscectomy, partial lateral meniscectomy and possibly chondroplasty depending on intraoperative findings.  He wishes to proceed as he is struggling to continue working.  I am reluctant to provide additional steroid injections as he only received 4 days relief with his first.  We will therefore schedule him for arthroscopic partial medial and partial lateral meniscectomies with possible chondroplasty.      ICD-10-CM ICD-9-CM   1. " Acute medial meniscus tear, left, subsequent encounter S83.242D V58.89     836.0   2. Acute lateral meniscus tear of left knee, subsequent encounter S83.282D V58.89     836.1         Scribed for Jimmy Zhu MD by Brionna Zhu RN.10:41 AM 01/30/2019

## 2019-02-03 NOTE — H&P
History and Physical      Patient: Uriah Torres  YOB: 1955  Date of Encounter: 01/30/2019      Chief Complaint:   Chief Complaint   Patient presents with   • Right Knee - Pain, Follow-up       HPI:   Uriah Torres, 63 y.o. male, seen today in follow-up right knee pain.  Symptoms began around the 4 months ago.  He continues to experience a catching sensation in his knee which makes it very difficult for him to walk.  He noticed improvement in his knee after steroid injection but within 4 days his pain has returned.  He limps especially toward the end of the day.  He does not experience true locking of his knee.  He has moderate pain at night describes a throbbing pain which prevents him from sleeping.  He has had MRI completed revealing medial meniscus tear and anterior horn lateral meniscus tears.  He has very mild degenerative changes present.    Active Problem List:  Patient Active Problem List   Diagnosis   • Tear of medial meniscus of right knee, current   • Tear of lateral meniscus of right knee, current   • Acute pain of right knee   • Effusion of knee joint right       Past Medical History:  Past Medical History:   Diagnosis Date   • Lactose intolerance in adult        Past Surgical History:  Past Surgical History:   Procedure Laterality Date   • APPENDECTOMY     • APPENDECTOMY     • COLONOSCOPY     • LEG DEBRIDEMENT Left 5/6/2018    Procedure: DEBRIDEMENT LEFT GIANT TOE;  Surgeon: Juancarlos Cortez MD;  Location: [facility];  Service: General       Family History:  Family History   Problem Relation Age of Onset   • Cancer Mother    • Diabetes Father    • Heart disease Brother        Social History:  Social History     Socioeconomic History   • Marital status:      Spouse name: Not on file   • Number of children: Not on file   • Years of education: Not on file   • Highest education level: Not on file   Social Needs   • Financial resource strain: Not on file   • Food insecurity - worry: Not  on file   • Food insecurity - inability: Not on file   • Transportation needs - medical: Not on file   • Transportation needs - non-medical: Not on file   Occupational History   • Not on file   Tobacco Use   • Smoking status: Never Smoker   • Smokeless tobacco: Never Used   Substance and Sexual Activity   • Alcohol use: No   • Drug use: No   • Sexual activity: Defer   Other Topics Concern   • Not on file   Social History Narrative   • Not on file     Patient's Body mass index is 34.33 kg/m². BMI is above normal parameters. Recommendations include: educational material.      Medications:  Current Outpatient Medications   Medication Sig Dispense Refill   • ibuprofen (ADVIL,MOTRIN) 100 MG/5ML suspension Take  by mouth Every 6 (Six) Hours As Needed for Mild Pain .     • HAVRIX 1440 EL U/ML vaccine inject 1 milliliter intramuscularly  0     No current facility-administered medications for this visit.        Allergies:  Allergies   Allergen Reactions   • Ciprofloxacin Rash       Review of Systems:   Review of Systems   Constitutional: Positive for activity change.   HENT: Negative.    Eyes: Negative.    Respiratory: Negative.    Cardiovascular: Negative.    Gastrointestinal: Negative.    Endocrine: Negative.    Genitourinary: Negative.    Musculoskeletal: Positive for arthralgias, gait problem and joint swelling.   Skin: Negative.    Allergic/Immunologic: Negative.    Hematological: Negative.    Psychiatric/Behavioral: Negative.        Physical Exam:   Physical Exam   Constitutional: He is oriented to person, place, and time. He appears well-developed. No distress.   HENT:   Head: Normocephalic and atraumatic.   Right Ear: External ear normal.   Left Ear: External ear normal.   Nose: Nose normal.   Eyes: Conjunctivae and EOM are normal. Right eye exhibits no discharge. Left eye exhibits no discharge.   Neck: Normal range of motion. Neck supple.   Cardiovascular: Normal rate, regular rhythm and normal heart sounds.   No  "murmur heard.  Pulmonary/Chest: Effort normal and breath sounds normal. No respiratory distress. He has no wheezes.   Abdominal: Soft. He exhibits no distension.   Musculoskeletal:     Right knee evaluation reveals moderate effusion, marked medial joint line tenderness, moderate lateral joint line tenderness positive Divine.  There is no gross instability with varus valgus stressing Lachman or drawer.  Neurovascular examination grossly intact.    Neurological: He is alert and oriented to person, place, and time.   Skin: Capillary refill takes less than 2 seconds. He is not diaphoretic. No erythema.   Psychiatric: He has a normal mood and affect. His behavior is normal. Judgment and thought content normal.     GENERAL: 63 y.o. male, alert and oriented X 3 in no acute distress.   Visit Vitals  /76   Pulse 74   Ht 185.4 cm (72.99\")   Wt 118 kg (260 lb 2.3 oz)   BMI 34.33 kg/m²       Radiology/Labs:    Radiographs right knee from previous visit show very mild squaring of the medial femoral condyle otherwise negative.      MRI again reviewed, there is horizontal tear involving the posterior horn the medial meniscus and also horizontal tear involving the anterior horn of the lateral meniscus.  Very mild degenerative changes noted.      Assessment & Plan:   63 y.o. male with continued right knee pain despite rest and intra-articular steroid injection.  Based on MRI showing obvious medial meniscus tear and questionable lateral meniscus tear, he is recommended arthroscopic partial medial meniscectomy, partial lateral meniscectomy and possibly chondroplasty depending on intraoperative findings.  He wishes to proceed as he is struggling to continue working.  I am reluctant to provide additional steroid injections as he only received 4 days relief with his first.  We will therefore schedule him for arthroscopic partial medial and partial lateral meniscectomies with possible chondroplasty.      ICD-10-CM ICD-9-CM   1. " Acute medial meniscus tear, left, subsequent encounter S83.242D V58.89     836.0   2. Acute lateral meniscus tear of left knee, subsequent encounter S83.282D V58.89     836.1         Scribed for Jimmy Zhu MD by Brionna Zhu RN.10:41 AM 01/30/2019

## 2019-02-05 ENCOUNTER — RESULTS ENCOUNTER (OUTPATIENT)
Dept: PREADMISSION TESTING | Facility: HOSPITAL | Age: 64
End: 2019-02-05

## 2019-02-06 ENCOUNTER — TELEPHONE (OUTPATIENT)
Dept: ORTHOPEDIC SURGERY | Facility: CLINIC | Age: 64
End: 2019-02-06

## 2019-02-07 ENCOUNTER — HOSPITAL ENCOUNTER (OUTPATIENT)
Facility: HOSPITAL | Age: 64
Setting detail: HOSPITAL OUTPATIENT SURGERY
Discharge: HOME | End: 2019-02-07
Attending: ORTHOPAEDIC SURGERY | Admitting: ORTHOPAEDIC SURGERY

## 2019-02-07 ENCOUNTER — ANESTHESIA (OUTPATIENT)
Dept: PERIOP | Facility: HOSPITAL | Age: 64
End: 2019-02-07

## 2019-02-07 ENCOUNTER — ANESTHESIA EVENT (OUTPATIENT)
Dept: PERIOP | Facility: HOSPITAL | Age: 64
End: 2019-02-07

## 2019-02-07 ENCOUNTER — APPOINTMENT (OUTPATIENT)
Dept: GENERAL RADIOLOGY | Facility: HOSPITAL | Age: 64
End: 2019-02-07

## 2019-02-07 VITALS
DIASTOLIC BLOOD PRESSURE: 74 MMHG | RESPIRATION RATE: 16 BRPM | HEIGHT: 73 IN | TEMPERATURE: 97.6 F | WEIGHT: 260 LBS | SYSTOLIC BLOOD PRESSURE: 122 MMHG | BODY MASS INDEX: 34.46 KG/M2 | HEART RATE: 75 BPM | OXYGEN SATURATION: 98 %

## 2019-02-07 DIAGNOSIS — S83.281A TEAR OF LATERAL MENISCUS OF RIGHT KNEE, CURRENT, UNSPECIFIED TEAR TYPE, INITIAL ENCOUNTER: ICD-10-CM

## 2019-02-07 DIAGNOSIS — M25.561 ACUTE PAIN OF RIGHT KNEE: ICD-10-CM

## 2019-02-07 DIAGNOSIS — M25.461 EFFUSION OF KNEE JOINT RIGHT: ICD-10-CM

## 2019-02-07 DIAGNOSIS — S83.241A TEAR OF MEDIAL MENISCUS OF RIGHT KNEE, CURRENT, UNSPECIFIED TEAR TYPE, INITIAL ENCOUNTER: ICD-10-CM

## 2019-02-07 PROCEDURE — 29880 ARTHRS KNE SRG MNISECTMY M&L: CPT | Performed by: ORTHOPAEDIC SURGERY

## 2019-02-07 PROCEDURE — 25010000002 PROPOFOL 1000 MG/ML EMULSION: Performed by: NURSE ANESTHETIST, CERTIFIED REGISTERED

## 2019-02-07 PROCEDURE — 25010000002 FENTANYL CITRATE (PF) 100 MCG/2ML SOLUTION: Performed by: NURSE ANESTHETIST, CERTIFIED REGISTERED

## 2019-02-07 RX ORDER — MIDAZOLAM HYDROCHLORIDE 1 MG/ML
2 INJECTION INTRAMUSCULAR; INTRAVENOUS
Status: DISCONTINUED | OUTPATIENT
Start: 2019-02-07 | End: 2019-02-07 | Stop reason: HOSPADM

## 2019-02-07 RX ORDER — ONDANSETRON 2 MG/ML
4 INJECTION INTRAMUSCULAR; INTRAVENOUS ONCE AS NEEDED
Status: DISCONTINUED | OUTPATIENT
Start: 2019-02-07 | End: 2019-02-07 | Stop reason: HOSPADM

## 2019-02-07 RX ORDER — SODIUM CHLORIDE 0.9 % (FLUSH) 0.9 %
3-10 SYRINGE (ML) INJECTION AS NEEDED
Status: DISCONTINUED | OUTPATIENT
Start: 2019-02-07 | End: 2019-02-07 | Stop reason: HOSPADM

## 2019-02-07 RX ORDER — FENTANYL CITRATE 50 UG/ML
50 INJECTION, SOLUTION INTRAMUSCULAR; INTRAVENOUS
Status: DISCONTINUED | OUTPATIENT
Start: 2019-02-07 | End: 2019-02-07 | Stop reason: HOSPADM

## 2019-02-07 RX ORDER — SODIUM CHLORIDE, SODIUM LACTATE, POTASSIUM CHLORIDE, CALCIUM CHLORIDE 600; 310; 30; 20 MG/100ML; MG/100ML; MG/100ML; MG/100ML
125 INJECTION, SOLUTION INTRAVENOUS CONTINUOUS
Status: DISCONTINUED | OUTPATIENT
Start: 2019-02-07 | End: 2019-02-07 | Stop reason: HOSPADM

## 2019-02-07 RX ORDER — MIDAZOLAM HYDROCHLORIDE 1 MG/ML
1 INJECTION INTRAMUSCULAR; INTRAVENOUS
Status: DISCONTINUED | OUTPATIENT
Start: 2019-02-07 | End: 2019-02-07 | Stop reason: HOSPADM

## 2019-02-07 RX ORDER — MEPERIDINE HYDROCHLORIDE 25 MG/ML
12.5 INJECTION INTRAMUSCULAR; INTRAVENOUS; SUBCUTANEOUS
Status: DISCONTINUED | OUTPATIENT
Start: 2019-02-07 | End: 2019-02-07 | Stop reason: HOSPADM

## 2019-02-07 RX ORDER — MAGNESIUM HYDROXIDE 1200 MG/15ML
LIQUID ORAL AS NEEDED
Status: DISCONTINUED | OUTPATIENT
Start: 2019-02-07 | End: 2019-02-07 | Stop reason: HOSPADM

## 2019-02-07 RX ORDER — IPRATROPIUM BROMIDE AND ALBUTEROL SULFATE 2.5; .5 MG/3ML; MG/3ML
3 SOLUTION RESPIRATORY (INHALATION) ONCE AS NEEDED
Status: DISCONTINUED | OUTPATIENT
Start: 2019-02-07 | End: 2019-02-07 | Stop reason: HOSPADM

## 2019-02-07 RX ORDER — BUPIVACAINE HYDROCHLORIDE 5 MG/ML
INJECTION, SOLUTION EPIDURAL; INTRACAUDAL AS NEEDED
Status: DISCONTINUED | OUTPATIENT
Start: 2019-02-07 | End: 2019-02-07 | Stop reason: SURG

## 2019-02-07 RX ORDER — FENTANYL CITRATE 50 UG/ML
INJECTION, SOLUTION INTRAMUSCULAR; INTRAVENOUS AS NEEDED
Status: DISCONTINUED | OUTPATIENT
Start: 2019-02-07 | End: 2019-02-07 | Stop reason: SURG

## 2019-02-07 RX ORDER — OXYCODONE HYDROCHLORIDE AND ACETAMINOPHEN 5; 325 MG/1; MG/1
1 TABLET ORAL ONCE AS NEEDED
Status: DISCONTINUED | OUTPATIENT
Start: 2019-02-07 | End: 2019-02-07 | Stop reason: HOSPADM

## 2019-02-07 RX ORDER — BUPIVACAINE HYDROCHLORIDE 5 MG/ML
INJECTION, SOLUTION PERINEURAL AS NEEDED
Status: DISCONTINUED | OUTPATIENT
Start: 2019-02-07 | End: 2019-02-07 | Stop reason: HOSPADM

## 2019-02-07 RX ORDER — HYDROCODONE BITARTRATE AND ACETAMINOPHEN 7.5; 325 MG/1; MG/1
1 TABLET ORAL EVERY 6 HOURS PRN
Qty: 20 TABLET | Refills: 0 | Status: SHIPPED | OUTPATIENT
Start: 2019-02-07 | End: 2019-02-13 | Stop reason: SDUPTHER

## 2019-02-07 RX ORDER — SODIUM CHLORIDE 0.9 % (FLUSH) 0.9 %
3 SYRINGE (ML) INJECTION EVERY 12 HOURS SCHEDULED
Status: DISCONTINUED | OUTPATIENT
Start: 2019-02-07 | End: 2019-02-07 | Stop reason: HOSPADM

## 2019-02-07 RX ADMIN — SODIUM CHLORIDE, POTASSIUM CHLORIDE, SODIUM LACTATE AND CALCIUM CHLORIDE 125 ML/HR: 600; 310; 30; 20 INJECTION, SOLUTION INTRAVENOUS at 07:20

## 2019-02-07 RX ADMIN — FENTANYL CITRATE 75 MCG: 50 INJECTION INTRAMUSCULAR; INTRAVENOUS at 07:47

## 2019-02-07 RX ADMIN — BUPIVACAINE HYDROCHLORIDE 2 ML: 5 INJECTION, SOLUTION EPIDURAL; INTRACAUDAL; PERINEURAL at 07:38

## 2019-02-07 RX ADMIN — FENTANYL CITRATE 25 MCG: 50 INJECTION INTRAMUSCULAR; INTRAVENOUS at 07:38

## 2019-02-07 RX ADMIN — PROPOFOL 75 MCG/KG/MIN: 10 INJECTION, EMULSION INTRAVENOUS at 07:45

## 2019-02-07 NOTE — ANESTHESIA PREPROCEDURE EVALUATION
Anesthesia Evaluation     Patient summary reviewed and Nursing notes reviewed   no history of anesthetic complications:  NPO Solid Status: > 8 hours  NPO Liquid Status: > 8 hours           Airway   Mallampati: II  TM distance: >3 FB  Neck ROM: full  no difficulty expected  Dental - normal exam     Pulmonary - negative pulmonary ROS and normal exam    breath sounds clear to auscultation  Cardiovascular - negative cardio ROS and normal exam  Exercise tolerance: excellent (>7 METS)    Rhythm: regular  Rate: normal        Neuro/Psych- negative ROS  GI/Hepatic/Renal/Endo - negative ROS     Musculoskeletal     (+) joint swelling,   Abdominal  - normal exam    Bowel sounds: normal.   Substance History - negative use     OB/GYN negative ob/gyn ROS         Other                        Anesthesia Plan    ASA 2     MAC, regional and general   (Ankle Block)  intravenous induction   Anesthetic plan, all risks, benefits, and alternatives have been provided, discussed and informed consent has been obtained with: patient.  Use of blood products discussed with patient .   Plan discussed with CRNA.

## 2019-02-07 NOTE — OP NOTE
KNEE ARTHROSCOPY  Procedure Note    Uriah Torres  2/7/2019    Pre-op Diagnosis:   Tear of medial meniscus of right knee, current, unspecified tear type, initial encounter [S83.241A]  Tear of lateral meniscus of right knee, current, unspecified tear type, initial encounter [S83.281A]  Acute pain of right knee [M25.561]  Effusion of knee joint right [M25.461]    Post-op Diagnosis:     Post-Op Diagnosis Codes:     * Tear of medial meniscus of right knee, current, unspecified tear type, initial encounter [S83.241A]     * Tear of lateral meniscus of right knee, current, unspecified tear type, initial encounter [S83.281A]     * Acute pain of right knee [M25.561]     * Effusion of knee joint right [M25.461]  Degenerative arthritis medial femoral condyle and trochlea  Procedure(s):  KNEE ARTHROSCOPY, PARTIAL MEDIAL MENISCECTOMY, PARTIAL LATERAL MENISCECTOMY, CHONDROPLASTY medial femoral condyle    Surgeon(s):  Jimmy Zhu MD    Anesthesia: Spinal/local    Operative technique: With patient in the operating theatre tunnel anesthetic was administered.  Tourniquet was applied to the right upper thigh.  Leg garces was positioned.  Right leg was then sterilely prepped and draped in usual manner.  Standard arthroscopy portals were created and the knee inflated with sterile saline.  Through the anterolateral portal the arthroscope was introduced.  Patella was intact.  The trochlea medial and lateral femoral condyles had 1 grade 2 fibrillation.  Medial compartment entered and there was obvious complex tear involving the posterior horn medial meniscus grade 2 fibrillation over the central lateral portion of the medial femoral condyle.  With probe introduced the medial meniscus tear was delineated and then removed piecemeal alternating with punch and patellar shaver dividing until stable meniscal rim remained.  With 3.5 nonaggressive shaver debris was evacuated and the defect within the medial femoral condyle was gently  provided contouring the periphery and removing loose articular cartilage.  Intercondylar notch was visualized revealing intact ACL lateral compartment entered.  Articular cartilage intact but there was horizontal tear involving the central and anterior portions of the lateral meniscus.  With the tear delineated with probe the inferior limb of the tear was removed with punch and shaver dividing to stable meniscus.  Final sweep through the knee showed no additional pathology.  The debris was evacuated.  Instruments removed portals injected with 30 cc 0.5 Marcaine.  Wounds were closed with 3-0 nylon sterile dressing was applied he was taken to recovery room in stable condition.    Staff:   Circulator: Marquis Narayanan RN  Scrub Person: Linda Umana  Assistant: Nghia Coffey    Estimated Blood Loss: None  Specimens:   none             * No orders in the log *    Implants/Grafts: none      Drains:  None      Complications: none    Tourniquet time: 59  min    Jimmy Zhu MD     Date: 2/7/2019  Time: 9:11 AM    Cc: Provider, No Known

## 2019-02-07 NOTE — ANESTHESIA PROCEDURE NOTES
Spinal Block      Patient location during procedure: OB  Start Time: 2/7/2019 7:33 AM  Stop Time: 2/7/2019 7:38 AM  Indication:at surgeon's request, post-op pain management and procedure for pain  Performed By  CRNA: Julio C Krueger CRNA  Preanesthetic Checklist  Completed: patient identified, site marked, surgical consent, pre-op evaluation, timeout performed, IV checked, risks and benefits discussed and monitors and equipment checked  Spinal Block Prep:  Patient Position:sitting  Sterile Tech:cap, gloves, mask and sterile barriers  Prep:Betadine  Patient Monitoring:blood pressure monitoring, continuous pulse oximetry and EKG  Spinal Block Procedure  Approach:midline  Guidance:landmark technique  Location:L3-L4  Needle Type:Pencan  Needle Gauge:24 G  Placement of Spinal needle event:cerebrospinal fluid aspirated  Paresthesia: no  Fluid Appearance:clear     Post Assessment  Patient Tolerance:patient tolerated the procedure well with no apparent complications  Complications no

## 2019-02-07 NOTE — ANESTHESIA POSTPROCEDURE EVALUATION
Patient: Uriah Torres    Procedure Summary     Date:  02/07/19 Room / Location:  [facility] OR 03 /  [facility]    Anesthesia Start:  0730 Anesthesia Stop:  0911    Procedure:  KNEE ARTHROSCOPY, PARTIAL MEDIAL MENISCECTOMY, PARTIAL LATERAL MENISCECTOMY, MEDIAL CHONDROPLASTY (Right Knee) Diagnosis:       Tear of medial meniscus of right knee, current, unspecified tear type, initial encounter      Tear of lateral meniscus of right knee, current, unspecified tear type, initial encounter      Acute pain of right knee      Effusion of knee joint right      (Tear of medial meniscus of right knee, current, unspecified tear type, initial encounter [S83.241A])      (Tear of lateral meniscus of right knee, current, unspecified tear type, initial encounter [S83.281A])      (Acute pain of right knee [M25.561])      (Effusion of knee joint right [M25.461])    Surgeon:  Jimmy Zhu MD Provider:  Parviz Shaver MD    Anesthesia Type:  MAC, regional, general ASA Status:  2          Anesthesia Type: MAC, regional, general  Last vitals  BP   122/74 (02/07/19 1032)   Temp   97.6 °F (36.4 °C) (02/07/19 0955)   Pulse   75 (02/07/19 1032)   Resp   16 (02/07/19 1032)     SpO2   98 % (02/07/19 1032)     Post Anesthesia Care and Evaluation    Patient location during evaluation: PHASE II  Patient participation: complete - patient participated  Level of consciousness: awake and alert  Pain score: 0  Pain management: adequate  Airway patency: patent  Anesthetic complications: No anesthetic complications    Cardiovascular status: acceptable  Respiratory status: acceptable  Hydration status: acceptable

## 2019-02-13 ENCOUNTER — OFFICE VISIT (OUTPATIENT)
Dept: ORTHOPEDIC SURGERY | Facility: CLINIC | Age: 64
End: 2019-02-13

## 2019-02-13 VITALS — BODY MASS INDEX: 34.46 KG/M2 | HEIGHT: 73 IN | WEIGHT: 260 LBS

## 2019-02-13 DIAGNOSIS — R11.0 NAUSEA: ICD-10-CM

## 2019-02-13 DIAGNOSIS — Z09 POSTOP CHECK: Primary | ICD-10-CM

## 2019-02-13 DIAGNOSIS — S83.282D ACUTE LATERAL MENISCUS TEAR OF LEFT KNEE, SUBSEQUENT ENCOUNTER: ICD-10-CM

## 2019-02-13 DIAGNOSIS — S83.242D ACUTE MEDIAL MENISCUS TEAR, LEFT, SUBSEQUENT ENCOUNTER: ICD-10-CM

## 2019-02-13 PROCEDURE — 99024 POSTOP FOLLOW-UP VISIT: CPT | Performed by: ORTHOPAEDIC SURGERY

## 2019-02-13 RX ORDER — HYDROCODONE BITARTRATE AND ACETAMINOPHEN 7.5; 325 MG/1; MG/1
1 TABLET ORAL EVERY 6 HOURS PRN
Qty: 12 TABLET | Refills: 0 | Status: SHIPPED | OUTPATIENT
Start: 2019-02-13

## 2019-02-13 RX ORDER — ONDANSETRON 4 MG/1
4 TABLET, ORALLY DISINTEGRATING ORAL EVERY 6 HOURS PRN
Status: SHIPPED | OUTPATIENT
Start: 2019-02-13

## 2019-02-13 NOTE — PROGRESS NOTES
Follow-up Visit         Patient: Uriah Torres  YOB: 1955  Date of Encounter: 02/13/2019      Chief  Complaint:   Chief Complaint   Patient presents with   • Right Knee - Post-op     02/07/2019  KNEE ARTHROSCOPY, PARTIAL MEDIAL MENISCECTOMY, PARTIAL LATERAL MENISCECTOMY, MEDIAL CHONDROPLASTY         HPI:  Uriah Torres, 63 y.o. male turns in follow-up arthroscopic partial medial meniscectomy partial lateral meniscectomy and chondroplasty.  He is overall doing much better, the stabbing pain medial aspect of his knee has resolved.    Medical History:  Patient Active Problem List   Diagnosis   • Tear of medial meniscus of right knee, current   • Tear of lateral meniscus of right knee, current   • Acute pain of right knee   • Effusion of knee joint right     Past Medical History:   Diagnosis Date   • Lactose intolerance in adult        Social History:  Social History     Socioeconomic History   • Marital status:      Spouse name: Not on file   • Number of children: Not on file   • Years of education: Not on file   • Highest education level: Not on file   Social Needs   • Financial resource strain: Not on file   • Food insecurity - worry: Not on file   • Food insecurity - inability: Not on file   • Transportation needs - medical: Not on file   • Transportation needs - non-medical: Not on file   Occupational History   • Not on file   Tobacco Use   • Smoking status: Never Smoker   • Smokeless tobacco: Never Used   Substance and Sexual Activity   • Alcohol use: No   • Drug use: No   • Sexual activity: Defer   Other Topics Concern   • Not on file   Social History Narrative   • Not on file       Surgical History:  Past Surgical History:   Procedure Laterality Date   • APPENDECTOMY     • APPENDECTOMY     • COLONOSCOPY     • KNEE ARTHROSCOPY Right 2/7/2019    Procedure: KNEE ARTHROSCOPY, PARTIAL MEDIAL MENISCECTOMY, PARTIAL LATERAL MENISCECTOMY, MEDIAL CHONDROPLASTY;  Surgeon: Jimmy Zhu,  MD;  Location: [facility];  Service: Orthopedics   • LEG DEBRIDEMENT Left 5/6/2018    Procedure: DEBRIDEMENT LEFT GIANT TOE;  Surgeon: Juancarlos Cortez MD;  Location: [facility];  Service: General       Examination:   Right knee evaluation shows mild effusion, arthroscopy portals intact.      Assessment & Plan:   63 y.o. male status post arthroscopic partial medial meniscectomy partial lateral meniscectomy and chondroplasty overall doing well.  He has tricompartmental arthritis but his symptoms are significantly relieved.  I think he is a good candidate for viscous supplementation in the future.  He is instructed on strengthening exercises, range of motion exercises, provided Norco No. 12, Zofran No. 12.  He will follow up in approximately 8 weeks' time         Diagnosis Plan   1. Postop check     2. Nausea  ondansetron ODT (ZOFRAN-ODT) disintegrating tablet 4 mg   3. Acute medial meniscus tear, left, subsequent encounter     4. Acute lateral meniscus tear of left knee, subsequent encounter             Scribed for Jimmy Zhu MD by Brionna Zhu RN.9:01 AM 02/13/2019

## 2019-02-19 ENCOUNTER — LAB REQUISITION (OUTPATIENT)
Dept: LAB | Facility: HOSPITAL | Age: 64
End: 2019-02-19

## 2019-02-19 DIAGNOSIS — Z00.00 ROUTINE GENERAL MEDICAL EXAMINATION AT A HEALTH CARE FACILITY: ICD-10-CM

## 2019-02-19 DIAGNOSIS — H16.213 EXPOSURE KERATOCONJUNCTIVITIS OF BOTH EYES: ICD-10-CM

## 2019-02-19 LAB
ALT SERPL W P-5'-P-CCNC: 45 U/L (ref 10–44)
HBV SURFACE AB SER RIA-ACNC: NORMAL
HCV AB SER DONR QL: NORMAL
HIV1+2 AB SER QL: NORMAL

## 2019-02-19 PROCEDURE — 86706 HEP B SURFACE ANTIBODY: CPT | Performed by: NURSE PRACTITIONER

## 2019-02-19 PROCEDURE — 86803 HEPATITIS C AB TEST: CPT | Performed by: NURSE PRACTITIONER

## 2019-02-19 PROCEDURE — 84460 ALANINE AMINO (ALT) (SGPT): CPT | Performed by: NURSE PRACTITIONER

## 2019-02-19 PROCEDURE — G0432 EIA HIV-1/HIV-2 SCREEN: HCPCS | Performed by: NURSE PRACTITIONER

## 2019-03-22 RX ORDER — SULFAMETHOXAZOLE AND TRIMETHOPRIM 800; 160 MG/1; MG/1
1 TABLET ORAL 2 TIMES DAILY
Qty: 20 TABLET | Refills: 0 | Status: SHIPPED | OUTPATIENT
Start: 2019-03-22

## 2024-04-16 ENCOUNTER — LAB (OUTPATIENT)
Dept: LAB | Facility: HOSPITAL | Age: 69
End: 2024-04-16
Payer: COMMERCIAL

## 2024-04-16 ENCOUNTER — OFFICE VISIT (OUTPATIENT)
Dept: CARDIOLOGY | Facility: CLINIC | Age: 69
End: 2024-04-16
Payer: COMMERCIAL

## 2024-04-16 VITALS
HEIGHT: 73 IN | SYSTOLIC BLOOD PRESSURE: 169 MMHG | BODY MASS INDEX: 36.18 KG/M2 | WEIGHT: 273 LBS | DIASTOLIC BLOOD PRESSURE: 106 MMHG | OXYGEN SATURATION: 96 % | HEART RATE: 86 BPM

## 2024-04-16 DIAGNOSIS — I10 ESSENTIAL HYPERTENSION: ICD-10-CM

## 2024-04-16 DIAGNOSIS — N18.32 STAGE 3B CHRONIC KIDNEY DISEASE: ICD-10-CM

## 2024-04-16 DIAGNOSIS — R60.0 BILATERAL LEG EDEMA: ICD-10-CM

## 2024-04-16 DIAGNOSIS — E78.2 MIXED HYPERLIPIDEMIA: ICD-10-CM

## 2024-04-16 DIAGNOSIS — G47.33 OSA ON CPAP: ICD-10-CM

## 2024-04-16 DIAGNOSIS — R06.02 SHORTNESS OF BREATH: ICD-10-CM

## 2024-04-16 DIAGNOSIS — I10 ESSENTIAL HYPERTENSION: Primary | ICD-10-CM

## 2024-04-16 PROBLEM — E11.9 TYPE 2 DIABETES MELLITUS: Status: ACTIVE | Noted: 2024-02-13

## 2024-04-16 PROBLEM — E78.5 HYPERLIPIDEMIA: Status: ACTIVE | Noted: 2024-02-13

## 2024-04-16 LAB
ANION GAP SERPL CALCULATED.3IONS-SCNC: 10.8 MMOL/L (ref 5–15)
BUN SERPL-MCNC: 24 MG/DL (ref 8–23)
BUN/CREAT SERPL: 15 (ref 7–25)
CALCIUM SPEC-SCNC: 9.1 MG/DL (ref 8.6–10.5)
CHLORIDE SERPL-SCNC: 104 MMOL/L (ref 98–107)
CO2 SERPL-SCNC: 26.2 MMOL/L (ref 22–29)
CREAT SERPL-MCNC: 1.6 MG/DL (ref 0.76–1.27)
EGFRCR SERPLBLD CKD-EPI 2021: 47.2 ML/MIN/1.73
GLUCOSE SERPL-MCNC: 98 MG/DL (ref 65–99)
POTASSIUM SERPL-SCNC: 3.8 MMOL/L (ref 3.5–5.2)
SODIUM SERPL-SCNC: 141 MMOL/L (ref 136–145)

## 2024-04-16 PROCEDURE — 36415 COLL VENOUS BLD VENIPUNCTURE: CPT

## 2024-04-16 PROCEDURE — 99204 OFFICE O/P NEW MOD 45 MIN: CPT | Performed by: INTERNAL MEDICINE

## 2024-04-16 PROCEDURE — 80048 BASIC METABOLIC PNL TOTAL CA: CPT

## 2024-04-16 RX ORDER — CARVEDILOL 6.25 MG/1
6.25 TABLET ORAL 2 TIMES DAILY WITH MEALS
COMMUNITY
End: 2024-04-16 | Stop reason: SDUPTHER

## 2024-04-16 RX ORDER — NIFEDIPINE 60 MG/1
60 TABLET, EXTENDED RELEASE ORAL DAILY
COMMUNITY

## 2024-04-16 RX ORDER — ROSUVASTATIN CALCIUM 10 MG/1
10 TABLET, COATED ORAL DAILY
COMMUNITY

## 2024-04-16 RX ORDER — CARVEDILOL 12.5 MG/1
12.5 TABLET ORAL 2 TIMES DAILY WITH MEALS
Qty: 60 TABLET | Refills: 11 | Status: SHIPPED | OUTPATIENT
Start: 2024-04-16

## 2024-04-16 RX ORDER — FUROSEMIDE 40 MG/1
40 TABLET ORAL DAILY PRN
COMMUNITY

## 2024-04-16 NOTE — PROGRESS NOTES
Subjective   Uriah Torres is a 68 y.o. male     Chief Complaint   Patient presents with    Establish Care     Here for eval.     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 Melissa Goldstein and Dr. Jackie Holman seen today to establish care and for evaluation of hypertension and severe lower extremity edema.    Uriah was in his usual state of health until he developed COVID in 2021.  He had a severe and protracted course with 1 month in the hospital.  He was discharged on supplemental oxygen which was continued for nearly 2 years.  CT of the chest in 2023 demonstrated normal heart size, moderate coronary atherosclerosis, and scarring and fibrosis in the upper lobes.  In March of this year BUN and creatinine were 26 and 1.9.  The patient states that he stopped angiotensin receptor blocker but his creatinine did not change.  He has had worsening lower extremity edema since being treated with nifedipine.    The patient denies chest pain, orthopnea, or PND.  He uses supplemental oxygen with his CPAP at night.  He describes that he was walking up to 3 miles a day after recovering from COVID but that now he is extremely short of breath and fatigued walking a mile or less.                      PRIOR MEDICATIONS    Current Outpatient Medications on File Prior to Visit   Medication Sig Dispense Refill    carvedilol (COREG) 6.25 MG tablet Take 1 tablet by mouth 2 (Two) Times a Day With Meals.      furosemide (LASIX) 40 MG tablet Take 1 tablet by mouth Daily As Needed. 3 LB weight gain      NIFEdipine XL (PROCARDIA XL) 60 MG 24 hr tablet Take 1 tablet by mouth Daily.      O2 (OXYGEN) Inhale 1 (One) Time. Through CPAP      rosuvastatin (CRESTOR) 10 MG tablet Take 1 tablet by mouth Daily.      [DISCONTINUED] azithromycin (ZITHROMAX) 250 MG tablet Take 2 tablets the first day, then  1 tablet daily for 4 days. Take as directed for Bronchitis. 6 tablet 0    [DISCONTINUED] HAVRIX 1440 EL U/ML vaccine inject 1 milliliter intramuscularly  0    [DISCONTINUED] HYDROcodone-acetaminophen (NORCO) 7.5-325 MG per tablet Take 1 tablet by mouth Every 6 (Six) Hours As Needed for Moderate Pain. 12 tablet 0    [DISCONTINUED] ibuprofen (ADVIL,MOTRIN) 100 MG/5ML suspension Take  by mouth Every 6 (Six) Hours As Needed for Mild Pain .      [DISCONTINUED] ondansetron (ZOFRAN) 8 MG tablet Take 1 tablet by mouth Every 8 (Eight) Hours As Needed for nausea and vomiting. 20 tablet 2    [DISCONTINUED] sulfamethoxazole-trimethoprim (BACTRIM DS) 800-160 MG per tablet Take 1 tablet by mouth 2 (Two) Times a Day. 20 tablet 0     Current Facility-Administered Medications on File Prior to Visit   Medication Dose Route Frequency Provider Last Rate Last Admin    [DISCONTINUED] ondansetron ODT (ZOFRAN-ODT) disintegrating tablet 4 mg  4 mg Oral Q6H PRN Jimmy Zhu MD           ALLERGIES:    Chlorhexidine, Ciprofloxacin, and Other    PAST MEDICAL HISTORY:    Past Medical History:   Diagnosis Date    Chronic kidney disease     Diabetes mellitus     2    Hyperlipidemia     Hypertension     Lactose intolerance in adult     Sleep apnea        SURGICAL HISTORY:    Past Surgical History:   Procedure Laterality Date    APPENDECTOMY      APPENDECTOMY      COLONOSCOPY      COLONOSCOPY      KNEE ARTHROSCOPY Right 02/07/2019    Procedure: KNEE ARTHROSCOPY, PARTIAL MEDIAL MENISCECTOMY, PARTIAL LATERAL MENISCECTOMY, MEDIAL CHONDROPLASTY;  Surgeon: Jimmy Zhu MD;  Location: [facility] OR;  Service: Orthopedics    LEG DEBRIDEMENT Left 05/06/2018    Procedure: DEBRIDEMENT LEFT GIANT TOE;  Surgeon: Juancarlos Cortez MD;  Location: [facility] OR;  Service: General       SOCIAL HISTORY:    Social History     Socioeconomic History    Marital status:    Tobacco Use    Smoking status: Never    Smokeless tobacco: Never   Substance and  Sexual Activity    Alcohol use: No    Drug use: No    Sexual activity: Defer       FAMILY HISTORY:    Family History   Problem Relation Age of Onset    Cancer Mother     Diabetes Father     Heart disease Brother        Review of Systems   Constitutional: Negative.  Negative for chills, diaphoresis, fatigue, fever and unexpected weight change.   HENT: Negative.     Eyes: Negative.  Negative for visual disturbance.   Respiratory: Negative.     Cardiovascular:  Positive for leg swelling (BLE). Negative for chest pain and palpitations.   Gastrointestinal:  Positive for blood in stool (Stopped ASA due to bleeding, hemmorhoids currenly.).   Endocrine: Negative.  Negative for cold intolerance and heat intolerance.   Genitourinary: Negative.  Negative for hematuria.   Musculoskeletal: Negative.    Skin: Negative.    Allergic/Immunologic: Negative.    Neurological: Negative.    Hematological: Negative.    Psychiatric/Behavioral: Negative.         VISIT VITALS:  There were no vitals filed for this visit.   There were no vitals taken for this visit.    RECENT LABS:    Objective       Physical Exam  Vitals and nursing note reviewed.   Constitutional:       General: He is not in acute distress.     Appearance: He is well-developed.   HENT:      Head: Normocephalic and atraumatic.   Eyes:      Conjunctiva/sclera: Conjunctivae normal.      Pupils: Pupils are equal, round, and reactive to light.      Comments: No ptosis or lid lag  Extra ocular motions intact  BARBARA  Mild haziness   Neck:      Vascular: No carotid bruit, hepatojugular reflux or JVD.      Trachea: No tracheal deviation.      Comments: Nl. Carotid upstrokes  Cardiovascular:      Rate and Rhythm: Normal rate and regular rhythm. Frequent Extrasystoles are present.     Pulses:           Radial pulses are 2+ on the right side and 2+ on the left side.      Heart sounds: Normal heart sounds, S1 normal and S2 normal. No murmur heard.     No friction rub. S4 sounds present.  No S3 sounds.      Comments: No TR  Pulmonary:      Effort: Pulmonary effort is normal.      Breath sounds: Normal breath sounds. No wheezing, rhonchi or rales.      Comments: Nl. Expir. Phase  Nl. Breath sound intensity  Abdominal:      General: Bowel sounds are normal. There is no distension or abdominal bruit.      Palpations: Abdomen is soft. There is no mass.      Tenderness: There is no abdominal tenderness. There is no guarding or rebound.      Comments: No organomegaly   Musculoskeletal:         General: No tenderness or deformity. Normal range of motion.      Cervical back: Normal range of motion and neck supple.      Right lower leg: Edema present.      Left lower leg: Edema present.      Comments: LLE, 4+ edema to knee, palpable PT pedal pulse  RLE, 4+ edema to almost to the knee, palpable pedal pulses   Skin:     General: Skin is warm and dry.      Coloration: Skin is not pale.      Findings: No erythema or rash.   Neurological:      Mental Status: He is alert and oriented to person, place, and time.   Psychiatric:         Behavior: Behavior normal.         Thought Content: Thought content normal.         Judgment: Judgment normal.         Procedures      Assessment & Plan   #1.  Systemic hypertension.  I am concerned about the relative recent onset of both hypertension and renal insufficiency.  Findings are suggestive of renal artery stenosis except for the fact that kidney function did not improve after discontinuing angiotensin receptor blocker.  Dr. Torres has atherosclerotic coronary artery disease by CT therefore is at increased risk for renal artery stenosis.  I would like to perform a duplex of the renal arteries to exclude stenosis.  In the meantime, without a more recent assessment of renal function, we will not change medications except to increase carvedilol to 12.5 mg twice daily which will have a minimal effect on blood pressure but which may also help with palpitations.  We will recheck  basic metabolic panel and make decisions on further treatment of blood pressure patient, in part, on findings of that study.    2.  Severe lower extremity edema.  Edema seems disproportionate to the Patient's decreased functional capacity and there is no other evidence of congestive heart failure by symptoms, exam today, or radiographic studies.  We will add physical measures to the patient's current medications.  He will elevate his legs 15 to 20 minutes at a time 3-4 times daily.  We will give a prescription for thigh-high moderate compression hose and 2 g sodium restriction.    3.  Palpitations.  The patient has frequent PACs on EKG and frequent extrasystoles by exam.  I would like to obtain an echocardiogram to assess LV systolic function, and also LV filling pressures and pulmonary pressures.  This may help direct therapy.    4.  Obstructive sleep apnea.    5.  The patient will follow with [facility] as instructed and we will plan on seeing him in follow-up after testing or on appearing basis as discussed.   Diagnosis Plan   1. Essential hypertension        2. Mixed hyperlipidemia        3. Stage 3b chronic kidney disease        4. HOLLI on CPAP            No follow-ups on file.       Patient brought in medicine bottles to appointment, they have been gone through with the patient. Med list was updated in the chart.      Advance Care Planning   ACP discussion was declined by the patient. Patient does not have an advance directive, declines further assistance.                Electronically signed by:    Scribed for Gamal Brasher MD by Sujata Albarran LPN on April 16, 2024  at 13:01 EDT    I, Gamal Brasher MD personally performed the services described in this documentation as scribed by the above named individual in my presence, and it is both accurate and complete. April 16, 2024 13:01 EDT      Dictated Utilizing Dragon Dictation: Part of this note may be an electronic  transcription/translation of spoken language to printed text using the Dragon Dictation System.

## 2024-04-18 ENCOUNTER — TELEPHONE (OUTPATIENT)
Dept: CARDIOLOGY | Facility: CLINIC | Age: 69
End: 2024-04-18
Payer: COMMERCIAL

## 2024-04-18 NOTE — TELEPHONE ENCOUNTER
DISCUSSED CREAT. 1.6 PER LABS YEST. AND DR. BRASHER'S RECOMMENDATION TO CONTINUE CURRENT MEDS AND CAN ADDRESS ACE/ARB THERAPY AT F/U APPT. IN JUNE. PATIENT VERBALIZED OK. JUNITO CARRANZA          ----- Message from Gamal Brasher MD sent at 4/18/2024  1:43 PM EDT -----  Creatinine is improved to 1.6.  The patient should continue current medications and we will discuss therapeutic options at the time of his next office visit.  ----- Message -----  From: Lab, Background User  Sent: 4/16/2024   6:34 PM EDT  To: Gamal Brasher MD

## 2024-05-02 ENCOUNTER — LAB (OUTPATIENT)
Dept: LAB | Facility: HOSPITAL | Age: 69
End: 2024-05-02
Payer: COMMERCIAL

## 2024-05-02 ENCOUNTER — HOSPITAL ENCOUNTER (OUTPATIENT)
Dept: CARDIOLOGY | Facility: HOSPITAL | Age: 69
Discharge: HOME | End: 2024-05-02
Payer: COMMERCIAL

## 2024-05-02 ENCOUNTER — TRANSCRIBE ORDERS (OUTPATIENT)
Dept: LAB | Facility: HOSPITAL | Age: 69
End: 2024-05-02
Payer: COMMERCIAL

## 2024-05-02 VITALS — WEIGHT: 273.37 LBS | BODY MASS INDEX: 36.23 KG/M2 | HEIGHT: 73 IN

## 2024-05-02 DIAGNOSIS — I10 ESSENTIAL HYPERTENSION: ICD-10-CM

## 2024-05-02 DIAGNOSIS — N18.32 STAGE 3B CHRONIC KIDNEY DISEASE: ICD-10-CM

## 2024-05-02 DIAGNOSIS — E78.2 MIXED HYPERLIPIDEMIA: ICD-10-CM

## 2024-05-02 DIAGNOSIS — R06.02 SHORTNESS OF BREATH: ICD-10-CM

## 2024-05-02 DIAGNOSIS — N18.32 STAGE 3B CHRONIC KIDNEY DISEASE: Primary | ICD-10-CM

## 2024-05-02 DIAGNOSIS — R60.0 BILATERAL LEG EDEMA: ICD-10-CM

## 2024-05-02 LAB
ALBUMIN SERPL-MCNC: 4.1 G/DL (ref 3.5–5.2)
ANION GAP SERPL CALCULATED.3IONS-SCNC: 13.4 MMOL/L (ref 5–15)
AORTIC DIMENSIONLESS INDEX: 0.64 (DI)
BACTERIA UR QL AUTO: NORMAL /HPF
BH CV ECHO MEAS - ACS: 1.38 CM
BH CV ECHO MEAS - AO MAX PG: 7.5 MMHG
BH CV ECHO MEAS - AO MEAN PG: 4.4 MMHG
BH CV ECHO MEAS - AO ROOT DIAM: 3.4 CM
BH CV ECHO MEAS - AO V2 MAX: 137.2 CM/SEC
BH CV ECHO MEAS - AO V2 VTI: 35.3 CM
BH CV ECHO MEAS - DIST REN A EDV LEFT: 20.8 CM/S
BH CV ECHO MEAS - DIST REN A PSV LEFT: 68.6 CM/S
BH CV ECHO MEAS - EDV(CUBED): 114 ML
BH CV ECHO MEAS - EF(MOD-BP): 58 %
BH CV ECHO MEAS - EF_3D-VOL: 62 %
BH CV ECHO MEAS - ESV(CUBED): 37.9 ML
BH CV ECHO MEAS - FS: 30.7 %
BH CV ECHO MEAS - IVS/LVPW: 1.01 CM
BH CV ECHO MEAS - IVSD: 1.31 CM
BH CV ECHO MEAS - LA DIMENSION: 4.4 CM
BH CV ECHO MEAS - LAT PEAK E' VEL: 6.6 CM/SEC
BH CV ECHO MEAS - LV MASS(C)D: 249.7 GRAMS
BH CV ECHO MEAS - LV MAX PG: 4.1 MMHG
BH CV ECHO MEAS - LV MEAN PG: 1.67 MMHG
BH CV ECHO MEAS - LV V1 MAX: 101.2 CM/SEC
BH CV ECHO MEAS - LV V1 VTI: 22.7 CM
BH CV ECHO MEAS - LVIDD: 4.8 CM
BH CV ECHO MEAS - LVIDS: 3.4 CM
BH CV ECHO MEAS - LVPWD: 1.3 CM
BH CV ECHO MEAS - MED PEAK E' VEL: 6 CM/SEC
BH CV ECHO MEAS - MID REN A EDV LEFT: 21.3 CM/S
BH CV ECHO MEAS - MID REN A PSV LEFT: 103 CM/S
BH CV ECHO MEAS - MV A MAX VEL: 94.8 CM/SEC
BH CV ECHO MEAS - MV DEC SLOPE: 370.4 CM/SEC2
BH CV ECHO MEAS - MV DEC TIME: 0.21 SEC
BH CV ECHO MEAS - MV E MAX VEL: 72.6 CM/SEC
BH CV ECHO MEAS - MV E/A: 0.77
BH CV ECHO MEAS - MV MAX PG: 5.3 MMHG
BH CV ECHO MEAS - MV MEAN PG: 2.08 MMHG
BH CV ECHO MEAS - MV P1/2T: 64.9 MSEC
BH CV ECHO MEAS - MV V2 VTI: 33.3 CM
BH CV ECHO MEAS - MVA(P1/2T): 3.4 CM2
BH CV ECHO MEAS - PA V2 MAX: 125.6 CM/SEC
BH CV ECHO MEAS - PROX REN A EDV LEFT: 30.7 CM/S
BH CV ECHO MEAS - PROX REN A PSV LEFT: 115 CM/S
BH CV ECHO MEAS - RAP SYSTOLE: 8 MMHG
BH CV ECHO MEAS - RV MAX PG: 2.14 MMHG
BH CV ECHO MEAS - RV V1 MAX: 73.2 CM/SEC
BH CV ECHO MEAS - RV V1 VTI: 18.6 CM
BH CV ECHO MEAS - RVDD: 3.8 CM
BH CV ECHO MEAS - RVSP: 27.1 MMHG
BH CV ECHO MEAS - TAPSE (>1.6): 2.46 CM
BH CV ECHO MEAS - TR MAX PG: 19.1 MMHG
BH CV ECHO MEAS - TR MAX VEL: 218.3 CM/SEC
BH CV ECHO MEASUREMENTS AVERAGE E/E' RATIO: 11.52
BH CV VAS KIDNEY HEIGHT LEFT: 5.8 CM
BH CV VAS RENAL AORTIC MID EDV: 12 CM/S
BH CV VAS RENAL AORTIC MID PSV: 91 CM/S
BH CV XLRA - TDI S': 18.4 CM/SEC
BH CV XLRA MEAS - KID L LEFT: 12.4 CM
BH CV XLRA MEAS DIST REN A EDV RIGHT: 24 CM/S
BH CV XLRA MEAS DIST REN A PSV RIGHT: 55.3 CM/S
BH CV XLRA MEAS KID H RIGHT: 4.6 CM
BH CV XLRA MEAS KID L RIGHT: 11.5 CM
BH CV XLRA MEAS KID W RIGHT: 5.2 CM
BH CV XLRA MEAS MID REN A EDV RIGHT: 28.9 CM/S
BH CV XLRA MEAS MID REN A PSV RIGHT: 80.5 CM/S
BH CV XLRA MEAS PROX REN A EDV RIGHT: 28.5 CM/S
BH CV XLRA MEAS PROX REN A PSV RIGHT: 82.4 CM/S
BH CV XLRA MEAS RAR LEFT: 1.3
BH CV XLRA MEAS RAR RIGHT: 0.9
BH CV XLRA MEAS RENAL A ORG EDV RIGHT: 19.3 CM/S
BH CV XLRA MEAS RENAL A ORG PSV RIGHT: 80.9 CM/S
BUN SERPL-MCNC: 27 MG/DL (ref 8–23)
BUN/CREAT SERPL: 14.9 (ref 7–25)
CALCIUM SPEC-SCNC: 9 MG/DL (ref 8.6–10.5)
CHLORIDE SERPL-SCNC: 104 MMOL/L (ref 98–107)
CO2 SERPL-SCNC: 24.6 MMOL/L (ref 22–29)
CREAT SERPL-MCNC: 1.81 MG/DL (ref 0.76–1.27)
EGFRCR SERPLBLD CKD-EPI 2021: 40.5 ML/MIN/1.73
GLUCOSE SERPL-MCNC: 122 MG/DL (ref 65–99)
HYALINE CASTS UR QL AUTO: NORMAL /LPF
LEFT KIDNEY WIDTH: 5.7 CM
PHOSPHATE SERPL-MCNC: 3.6 MG/DL (ref 2.5–4.5)
POTASSIUM SERPL-SCNC: 4.1 MMOL/L (ref 3.5–5.2)
RBC # UR STRIP: NORMAL /HPF
REF LAB TEST METHOD: NORMAL
SINUS: 3.4 CM
SODIUM SERPL-SCNC: 142 MMOL/L (ref 136–145)
SQUAMOUS #/AREA URNS HPF: NORMAL /HPF
WBC # UR STRIP: NORMAL /HPF

## 2024-05-02 PROCEDURE — 36415 COLL VENOUS BLD VENIPUNCTURE: CPT

## 2024-05-02 PROCEDURE — 84155 ASSAY OF PROTEIN SERUM: CPT

## 2024-05-02 PROCEDURE — 82088 ASSAY OF ALDOSTERONE: CPT

## 2024-05-02 PROCEDURE — 93306 TTE W/DOPPLER COMPLETE: CPT

## 2024-05-02 PROCEDURE — 82784 ASSAY IGA/IGD/IGG/IGM EACH: CPT

## 2024-05-02 PROCEDURE — 84156 ASSAY OF PROTEIN URINE: CPT

## 2024-05-02 PROCEDURE — 81015 MICROSCOPIC EXAM OF URINE: CPT

## 2024-05-02 PROCEDURE — 82570 ASSAY OF URINE CREATININE: CPT

## 2024-05-02 PROCEDURE — 86334 IMMUNOFIX E-PHORESIS SERUM: CPT

## 2024-05-02 PROCEDURE — 80069 RENAL FUNCTION PANEL: CPT

## 2024-05-02 PROCEDURE — 84244 ASSAY OF RENIN: CPT

## 2024-05-02 PROCEDURE — 82043 UR ALBUMIN QUANTITATIVE: CPT

## 2024-05-02 PROCEDURE — 93975 VASCULAR STUDY: CPT

## 2024-05-02 PROCEDURE — 84166 PROTEIN E-PHORESIS/URINE/CSF: CPT

## 2024-05-02 PROCEDURE — 84165 PROTEIN E-PHORESIS SERUM: CPT

## 2024-05-03 LAB
ALBUMIN SERPL ELPH-MCNC: 3.7 G/DL (ref 2.9–4.4)
ALBUMIN UR-MCNC: 2.3 MG/DL
ALBUMIN/GLOB SERPL: 1.3 {RATIO} (ref 0.7–1.7)
ALPHA1 GLOB SERPL ELPH-MCNC: 0.2 G/DL (ref 0–0.4)
ALPHA2 GLOB SERPL ELPH-MCNC: 0.8 G/DL (ref 0.4–1)
B-GLOBULIN SERPL ELPH-MCNC: 1 G/DL (ref 0.7–1.3)
CREAT UR-MCNC: 71.6 MG/DL
GAMMA GLOB SERPL ELPH-MCNC: 1 G/DL (ref 0.4–1.8)
GLOBULIN SER-MCNC: 2.9 G/DL (ref 2.2–3.9)
IGA SERPL-MCNC: 173 MG/DL (ref 61–437)
IGG SERPL-MCNC: 1117 MG/DL (ref 603–1613)
IGM SERPL-MCNC: 49 MG/DL (ref 20–172)
INTERPRETATION SERPL IEP-IMP: NORMAL
LABORATORY COMMENT REPORT: NORMAL
M PROTEIN SERPL ELPH-MCNC: NORMAL G/DL
MICROALBUMIN/CREAT UR: 32.1 MG/G (ref 0–29)
PROT SERPL-MCNC: 6.6 G/DL (ref 6–8.5)

## 2024-05-06 LAB
ALBUMIN MFR UR ELPH: 33.4 %
ALPHA1 GLOB MFR UR ELPH: 6.4 %
ALPHA2 GLOB MFR UR ELPH: 15.1 %
B-GLOBULIN MFR UR ELPH: 27.4 %
GAMMA GLOB MFR UR ELPH: 17.7 %
LABORATORY COMMENT REPORT: NORMAL
M PROTEIN MFR UR ELPH: NORMAL %
PROT UR-MCNC: 10.7 MG/DL

## 2024-05-07 LAB — ALDOST SERPL-MCNC: 15.8 NG/DL (ref 0–30)

## 2024-05-08 LAB — RENIN PLAS-CCNC: 2.86 NG/ML/HR (ref 0.17–5.38)

## 2024-05-10 LAB
BH CV ECHO MEAS - DIST REN A EDV LEFT: 20.8 CM/S
BH CV ECHO MEAS - DIST REN A PSV LEFT: 68.6 CM/S
BH CV ECHO MEAS - MID REN A EDV LEFT: 21.3 CM/S
BH CV ECHO MEAS - MID REN A PSV LEFT: 103 CM/S
BH CV ECHO MEAS - PROX REN A EDV LEFT: 30.7 CM/S
BH CV ECHO MEAS - PROX REN A PSV LEFT: 115 CM/S
BH CV VAS KIDNEY HEIGHT LEFT: 5.8 CM
BH CV VAS RENAL AORTIC MID EDV: 12 CM/S
BH CV VAS RENAL AORTIC MID PSV: 91 CM/S
BH CV XLRA MEAS - KID L LEFT: 12.4 CM
BH CV XLRA MEAS DIST REN A EDV RIGHT: 24 CM/S
BH CV XLRA MEAS DIST REN A PSV RIGHT: 55.3 CM/S
BH CV XLRA MEAS KID H RIGHT: 4.6 CM
BH CV XLRA MEAS KID L RIGHT: 11.5 CM
BH CV XLRA MEAS KID W RIGHT: 5.2 CM
BH CV XLRA MEAS MID REN A EDV RIGHT: 28.9 CM/S
BH CV XLRA MEAS MID REN A PSV RIGHT: 80.5 CM/S
BH CV XLRA MEAS PROX REN A EDV RIGHT: 28.5 CM/S
BH CV XLRA MEAS PROX REN A PSV RIGHT: 82.4 CM/S
BH CV XLRA MEAS RAR LEFT: 1.3
BH CV XLRA MEAS RAR RIGHT: 0.9
BH CV XLRA MEAS RENAL A ORG EDV RIGHT: 19.3 CM/S
BH CV XLRA MEAS RENAL A ORG PSV RIGHT: 80.9 CM/S
LEFT KIDNEY WIDTH: 5.7 CM

## 2024-05-15 ENCOUNTER — DOCUMENTATION (OUTPATIENT)
Dept: CARDIOLOGY | Facility: CLINIC | Age: 69
End: 2024-05-15
Payer: COMMERCIAL

## 2024-05-15 LAB
AORTIC DIMENSIONLESS INDEX: 0.64 (DI)
BH CV ECHO MEAS - ACS: 1.38 CM
BH CV ECHO MEAS - AO MAX PG: 7.5 MMHG
BH CV ECHO MEAS - AO MEAN PG: 4.4 MMHG
BH CV ECHO MEAS - AO ROOT DIAM: 3.4 CM
BH CV ECHO MEAS - AO V2 MAX: 137.2 CM/SEC
BH CV ECHO MEAS - AO V2 VTI: 35.3 CM
BH CV ECHO MEAS - EDV(CUBED): 114 ML
BH CV ECHO MEAS - EF(MOD-BP): 58 %
BH CV ECHO MEAS - EF_3D-VOL: 62 %
BH CV ECHO MEAS - ESV(CUBED): 37.9 ML
BH CV ECHO MEAS - FS: 30.7 %
BH CV ECHO MEAS - IVS/LVPW: 1.01 CM
BH CV ECHO MEAS - IVSD: 1.31 CM
BH CV ECHO MEAS - LA DIMENSION: 4.4 CM
BH CV ECHO MEAS - LAT PEAK E' VEL: 6.6 CM/SEC
BH CV ECHO MEAS - LV MASS(C)D: 249.7 GRAMS
BH CV ECHO MEAS - LV MAX PG: 4.1 MMHG
BH CV ECHO MEAS - LV MEAN PG: 1.67 MMHG
BH CV ECHO MEAS - LV V1 MAX: 101.2 CM/SEC
BH CV ECHO MEAS - LV V1 VTI: 22.7 CM
BH CV ECHO MEAS - LVIDD: 4.8 CM
BH CV ECHO MEAS - LVIDS: 3.4 CM
BH CV ECHO MEAS - LVPWD: 1.3 CM
BH CV ECHO MEAS - MED PEAK E' VEL: 6 CM/SEC
BH CV ECHO MEAS - MV A MAX VEL: 94.8 CM/SEC
BH CV ECHO MEAS - MV DEC SLOPE: 370.4 CM/SEC2
BH CV ECHO MEAS - MV DEC TIME: 0.21 SEC
BH CV ECHO MEAS - MV E MAX VEL: 72.6 CM/SEC
BH CV ECHO MEAS - MV E/A: 0.77
BH CV ECHO MEAS - MV MAX PG: 5.3 MMHG
BH CV ECHO MEAS - MV MEAN PG: 2.08 MMHG
BH CV ECHO MEAS - MV P1/2T: 64.9 MSEC
BH CV ECHO MEAS - MV V2 VTI: 33.3 CM
BH CV ECHO MEAS - MVA(P1/2T): 3.4 CM2
BH CV ECHO MEAS - PA V2 MAX: 125.6 CM/SEC
BH CV ECHO MEAS - RAP SYSTOLE: 8 MMHG
BH CV ECHO MEAS - RV MAX PG: 2.14 MMHG
BH CV ECHO MEAS - RV V1 MAX: 73.2 CM/SEC
BH CV ECHO MEAS - RV V1 VTI: 18.6 CM
BH CV ECHO MEAS - RVDD: 3.8 CM
BH CV ECHO MEAS - RVSP: 27.1 MMHG
BH CV ECHO MEAS - TAPSE (>1.6): 2.46 CM
BH CV ECHO MEAS - TR MAX PG: 19.1 MMHG
BH CV ECHO MEAS - TR MAX VEL: 218.3 CM/SEC
BH CV ECHO MEASUREMENTS AVERAGE E/E' RATIO: 11.52
BH CV XLRA - TDI S': 18.4 CM/SEC
SINUS: 3.4 CM

## 2024-05-15 NOTE — PROGRESS NOTES
Echo demonstrated a small pericardial effusion without evidence of tamponade physiology.  Most recent labs demonstrated a creatinine of 1.8.  I have asked Dr. Arnett to follow-up within the next week or so rather than his scheduled appointment.  We will not start ACE or ARB given the patient's creatinine and the fact that his renal evaluation has not been completed

## 2024-05-21 ENCOUNTER — OFFICE VISIT (OUTPATIENT)
Dept: CARDIOLOGY | Facility: CLINIC | Age: 69
End: 2024-05-21
Payer: COMMERCIAL

## 2024-05-21 VITALS
HEIGHT: 73 IN | SYSTOLIC BLOOD PRESSURE: 121 MMHG | WEIGHT: 268.4 LBS | BODY MASS INDEX: 35.57 KG/M2 | OXYGEN SATURATION: 91 % | DIASTOLIC BLOOD PRESSURE: 75 MMHG | HEART RATE: 78 BPM

## 2024-05-21 DIAGNOSIS — I10 ESSENTIAL HYPERTENSION: Primary | ICD-10-CM

## 2024-05-21 DIAGNOSIS — R60.0 BILATERAL LEG EDEMA: ICD-10-CM

## 2024-05-21 DIAGNOSIS — I31.39 PERICARDIAL EFFUSION: ICD-10-CM

## 2024-05-21 DIAGNOSIS — E78.2 MIXED HYPERLIPIDEMIA: ICD-10-CM

## 2024-05-21 DIAGNOSIS — G47.33 OSA ON CPAP: ICD-10-CM

## 2024-05-21 PROCEDURE — 99213 OFFICE O/P EST LOW 20 MIN: CPT | Performed by: INTERNAL MEDICINE

## 2024-05-21 RX ORDER — CARVEDILOL 25 MG/1
25 TABLET ORAL 2 TIMES DAILY WITH MEALS
Qty: 60 TABLET | Refills: 11 | Status: SHIPPED | OUTPATIENT
Start: 2024-05-21

## 2024-05-21 RX ORDER — FUROSEMIDE 40 MG/1
40 TABLET ORAL DAILY
Qty: 30 TABLET | Refills: 11 | Status: SHIPPED | OUTPATIENT
Start: 2024-05-21

## 2024-05-21 RX ORDER — NIFEDIPINE 30 MG
30 TABLET, EXTENDED RELEASE ORAL DAILY
Qty: 30 TABLET | Refills: 11 | Status: SHIPPED | OUTPATIENT
Start: 2024-05-21

## 2024-05-21 RX ORDER — POTASSIUM CHLORIDE 750 MG/1
10 TABLET, FILM COATED, EXTENDED RELEASE ORAL DAILY
Qty: 30 TABLET | Refills: 11 | Status: SHIPPED | OUTPATIENT
Start: 2024-05-21

## 2024-05-21 NOTE — PROGRESS NOTES
Subjective   Uriah Torres is a 69 y.o. male     Chief Complaint   Patient presents with    Follow-up     Here for echo f/u     Hyperlipidemia    Hypertension    Pericardial Effusion     Per echo       PROBLEM LIST:     HTN  Hyperlipidemia  DM, type 2  CKD  HOLLI, uses CPAP  Echo, 5-2-2024. EF 60-65%, grade 1 DD, milfd LVH, trivial MR / AI / TR, small posterolateral and lateral pericardial effusion with no tamponade, pulm. 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    He is symptomatically unchanged from the time of his last visit although he has lost 5 pounds.  He continues to have significant lower extremity edema.  Blood pressures are better controlled.  He continues to be without chest pain, orthopnea, or PND.  He does not palpitate he has no dizziness presyncope or syncope.    Renal ultrasound demonstrated no evidence of renal artery stenosis.    Echo demonstrated preserved LV systolic function, grade 1 diastolic dysfunction, mild LVH, small posterolateral lateral pericardial effusion without evidence of tamponade physiology.  Pulmonary pressures were not elevated.                        PRIOR MEDICATIONS    Current Outpatient Medications on File Prior to Visit   Medication Sig Dispense Refill    carvedilol (COREG) 12.5 MG tablet Take 1 tablet by mouth 2 (Two) Times a Day With Meals. 60 tablet 11    furosemide (LASIX) 40 MG tablet Take 1 tablet by mouth Daily As Needed. 3 LB weight gain      NIFEdipine XL (PROCARDIA XL) 60 MG 24 hr tablet Take 1 tablet by mouth Daily.      O2 (OXYGEN) Inhale 1 (One) Time. Through CPAP      rosuvastatin (CRESTOR) 10 MG tablet Take 1 tablet by mouth Daily.       No current facility-administered medications on file prior to visit.       ALLERGIES:    Chlorhexidine, Ciprofloxacin, and Other    PAST MEDICAL HISTORY:    Past Medical History:  "  Diagnosis Date    Chronic kidney disease     Diabetes mellitus     2    Hyperlipidemia     Hypertension     Lactose intolerance in adult     Sleep apnea        SURGICAL HISTORY:    Past Surgical History:   Procedure Laterality Date    APPENDECTOMY      APPENDECTOMY      COLONOSCOPY      COLONOSCOPY      KNEE ARTHROSCOPY Right 02/07/2019    Procedure: KNEE ARTHROSCOPY, PARTIAL MEDIAL MENISCECTOMY, PARTIAL LATERAL MENISCECTOMY, MEDIAL CHONDROPLASTY;  Surgeon: Jimmy Zhu MD;  Location: [facility];  Service: Orthopedics    LEG DEBRIDEMENT Left 05/06/2018    Procedure: DEBRIDEMENT LEFT GIANT TOE;  Surgeon: Juancarlos Cortez MD;  Location: [facility] OR;  Service: General       SOCIAL HISTORY:    Social History     Socioeconomic History    Marital status:    Tobacco Use    Smoking status: Never    Smokeless tobacco: Never   Substance and Sexual Activity    Alcohol use: No    Drug use: No    Sexual activity: Defer       FAMILY HISTORY:    Family History   Problem Relation Age of Onset    Cancer Mother     Diabetes Father     Heart disease Brother        Review of Systems   Constitutional: Negative.    HENT: Negative.     Eyes:  Positive for visual disturbance (glasses).   Respiratory: Negative.     Cardiovascular:  Positive for leg swelling. Negative for chest pain and palpitations.   Gastrointestinal: Negative.    Endocrine: Negative.    Genitourinary: Negative.    Musculoskeletal:  Positive for back pain. Negative for arthralgias and myalgias.   Skin: Negative.    Allergic/Immunologic: Negative.    Neurological: Negative.    Hematological: Negative.    Psychiatric/Behavioral: Negative.         VISIT VITALS:  Vitals:    05/21/24 0845   BP: 121/75   BP Location: Left arm   Patient Position: Sitting   Pulse: 78   SpO2: 91%   Weight: 122 kg (268 lb 6.4 oz)   Height: 185 cm (72.84\")      /75 (BP Location: Left arm, Patient Position: Sitting)   Pulse 78   Ht 185 cm (72.84\")   Wt 122 kg (268 lb 6.4 oz)  "  SpO2 91%   BMI 35.57 kg/m²     RECENT LABS:    Objective       Physical Exam  Vitals and nursing note reviewed.   Constitutional:       General: He is not in acute distress.     Appearance: He is well-developed.   HENT:      Head: Normocephalic and atraumatic.   Eyes:      Conjunctiva/sclera: Conjunctivae normal.      Pupils: Pupils are equal, round, and reactive to light.   Neck:      Vascular: No carotid bruit, hepatojugular reflux or JVD.      Trachea: No tracheal deviation.      Comments: Nl. Carotid upstrokes  Cardiovascular:      Rate and Rhythm: Normal rate and regular rhythm.      Pulses:           Radial pulses are 2+ on the right side and 2+ on the left side.      Heart sounds: Normal heart sounds, S1 normal and S2 normal. No murmur heard.     No friction rub. S4 sounds present. No S3 sounds.      Comments: No pericardial knock or rub  Pulmonary:      Effort: Pulmonary effort is normal.      Breath sounds: Normal breath sounds. No wheezing, rhonchi or rales.      Comments: Nl. Expir. Phase  Nl. Breath sound intensity  Abdominal:      General: Bowel sounds are normal. There is no distension or abdominal bruit.      Palpations: Abdomen is soft. There is no mass.      Tenderness: There is no abdominal tenderness. There is no guarding or rebound.      Comments: No organomegaly   Musculoskeletal:         General: No tenderness or deformity. Normal range of motion.      Cervical back: Normal range of motion and neck supple.      Right lower leg: Edema present.      Left lower leg: Edema present.      Comments: BLE, 3+ edema to upper calf. Pedal pulses not assessed     Skin:     General: Skin is warm and dry.      Coloration: Skin is not pale.      Findings: No erythema or rash.   Neurological:      Mental Status: He is alert and oriented to person, place, and time.   Psychiatric:         Behavior: Behavior normal.         Thought Content: Thought content normal.         Judgment: Judgment normal.          Procedures      Assessment & Plan   #1.  Lower extremity edema.  I still do not see a single underlying pathology to explain the patient's constellation of symptoms.  However, given the degree of edema today I think that aggressive management is appropriate while we maintain careful observation of renal function.  Dr. Torres will start Lasix 40 mg daily, potassium 10 mEq daily and we will check labs in 1 week.  He will continue with sodium restriction.  He has compression hose on order, and he will perform leg elevation.    2.  Systemic hypertension.  Blood pressures are well-controlled.  Will continue to monitor.  However, due to edema, I would like to decrease nifedipine to 30 mg daily and increase carvedilol to 25 mg twice daily.  Uriah will monitor his heart rate and blood pressure report any difficulties.    3.  The patient will follow with Dr. Farrell as instructed we will plan on seeing him in follow-up in 2 weeks or on appearing basis as discussed.  No diagnosis found.    No follow-ups on file.         Uriah Torres  reports that he has never smoked. He has never used smokeless tobacco. I have educated him on the risk of diseases from using tobacco products such as cancer, COPD, and heart disease.               Class 2 Severe Obesity (BMI >=35 and <=39.9). Obesity-related health conditions include the following: obstructive sleep apnea, hypertension, diabetes mellitus, and dyslipidemias. Obesity is  to be assessed at today's visit . BMI is  pcp addressing . We discussed portion control and increasing exercise.       Advance Care Planning   ACP discussion was held with the patient during this visit. Patient does not have an advance directive, declines further assistance.            Electronically signed by:    Scribed for Gamal Brasher MD by Sujata Albarran LPN on May 21, 2024  at 08:48 EDT    IGamal MD personally performed the services described in this documentation as scribed by the  above named individual in my presence, and it is both accurate and complete. May 21, 2024 08:48 EDT      Dictated Utilizing Dragon Dictation: Part of this note may be an electronic transcription/translation of spoken language to printed text using the Dragon Dictation System.

## 2024-05-29 ENCOUNTER — LAB (OUTPATIENT)
Dept: LAB | Facility: HOSPITAL | Age: 69
End: 2024-05-29
Payer: COMMERCIAL

## 2024-05-29 DIAGNOSIS — I31.39 PERICARDIAL EFFUSION: ICD-10-CM

## 2024-05-29 DIAGNOSIS — R53.82 CHRONIC FATIGUE: ICD-10-CM

## 2024-05-29 DIAGNOSIS — R60.0 BILATERAL LEG EDEMA: ICD-10-CM

## 2024-05-29 DIAGNOSIS — R68.89 COLD INTOLERANCE: ICD-10-CM

## 2024-05-29 LAB
ANION GAP SERPL CALCULATED.3IONS-SCNC: 10.6 MMOL/L (ref 5–15)
BUN SERPL-MCNC: 19 MG/DL (ref 8–23)
BUN/CREAT SERPL: 11.6 (ref 7–25)
CALCIUM SPEC-SCNC: 9 MG/DL (ref 8.6–10.5)
CHLORIDE SERPL-SCNC: 104 MMOL/L (ref 98–107)
CO2 SERPL-SCNC: 25.4 MMOL/L (ref 22–29)
CREAT SERPL-MCNC: 1.64 MG/DL (ref 0.76–1.27)
EGFRCR SERPLBLD CKD-EPI 2021: 45.6 ML/MIN/1.73
GLUCOSE SERPL-MCNC: 127 MG/DL (ref 65–99)
MAGNESIUM SERPL-MCNC: 2.2 MG/DL (ref 1.6–2.4)
POTASSIUM SERPL-SCNC: 4 MMOL/L (ref 3.5–5.2)
SODIUM SERPL-SCNC: 140 MMOL/L (ref 136–145)
TSH SERPL DL<=0.05 MIU/L-ACNC: 5.79 UIU/ML (ref 0.27–4.2)

## 2024-05-29 PROCEDURE — 80048 BASIC METABOLIC PNL TOTAL CA: CPT

## 2024-05-29 PROCEDURE — 84443 ASSAY THYROID STIM HORMONE: CPT

## 2024-05-29 PROCEDURE — 83735 ASSAY OF MAGNESIUM: CPT

## 2024-05-29 PROCEDURE — 36415 COLL VENOUS BLD VENIPUNCTURE: CPT

## 2024-05-30 ENCOUNTER — TELEPHONE (OUTPATIENT)
Dept: CARDIOLOGY | Facility: CLINIC | Age: 69
End: 2024-05-30
Payer: COMMERCIAL

## 2024-05-30 NOTE — TELEPHONE ENCOUNTER
Gamal Brasher MD Hawk, Patricia E, MARIA ISABELN  Schedule appointment with nephrology if not already accomplished.  Refer to primary care provider regarding elevated TSH.    DISCUSSED LAB RESULTS WITH MR. CARLOS AND HE DOES SEE DR. CABRERA NEXT MO. AWARE TSH RESULTS WILL BE FORWARDED TO PCP'S OFFICE FOR EVAL. AND TREATMENT. BMP / MAG/ TSH ALL FORWARDED. VERBALIZED HE UNDERSTOOD. PH,LPN

## 2024-06-12 ENCOUNTER — OFFICE VISIT (OUTPATIENT)
Dept: CARDIOLOGY | Facility: CLINIC | Age: 69
End: 2024-06-12
Payer: COMMERCIAL

## 2024-06-12 VITALS
SYSTOLIC BLOOD PRESSURE: 101 MMHG | WEIGHT: 262.6 LBS | HEIGHT: 73 IN | HEART RATE: 68 BPM | BODY MASS INDEX: 34.8 KG/M2 | OXYGEN SATURATION: 93 % | DIASTOLIC BLOOD PRESSURE: 65 MMHG

## 2024-06-12 DIAGNOSIS — R60.0 BILATERAL LEG EDEMA: ICD-10-CM

## 2024-06-12 DIAGNOSIS — G47.33 OSA ON CPAP: ICD-10-CM

## 2024-06-12 DIAGNOSIS — E03.9 HYPOTHYROIDISM, UNSPECIFIED TYPE: ICD-10-CM

## 2024-06-12 DIAGNOSIS — I31.39 PERICARDIAL EFFUSION: ICD-10-CM

## 2024-06-12 DIAGNOSIS — I10 ESSENTIAL HYPERTENSION: Primary | ICD-10-CM

## 2024-06-12 DIAGNOSIS — E78.2 MIXED HYPERLIPIDEMIA: ICD-10-CM

## 2024-06-12 PROCEDURE — 99213 OFFICE O/P EST LOW 20 MIN: CPT | Performed by: INTERNAL MEDICINE

## 2024-06-12 RX ORDER — LEVOTHYROXINE SODIUM 0.05 MG/1
50 TABLET ORAL DAILY
Qty: 30 TABLET | Refills: 11 | Status: SHIPPED | OUTPATIENT
Start: 2024-06-12

## 2024-06-12 NOTE — PROGRESS NOTES
Subjective   Uriah Torres is a 69 y.o. male     Chief Complaint   Patient presents with    Follow-up     Here for f/u    Leg Swelling    Hyperlipidemia    Hypertension    Sleep Apnea       PROBLEM LIST:     HTN  Hyperlipidemia  DM, type 2  CKD  HOLLI, uses CPAP  Echo, 5-2-2024. EF 60-65%, grade 1 DD, milfd LVH, trivial MR / AI / TR, small posterolateral and lateral pericardial effusion with no tamponade, pulm. 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    He feels somewhat improved from prior.  His edema has decreased but is still significant.  He notices edema in his hands as well as in his lower extremities.  Dyspnea and functional capacity remains stable.  Blood pressures have been well-controlled.  Echo demonstrated a small posterolateral lateral pericardial effusion with no evidence of impaired LV filling.    Uriah is making a deliberate effort at calorie and sodium restriction.  Blood pressures are well-controlled.  However, he complains of fatigue, dry skin, and marked cold intolerance.  TSH is mildly elevated at 5.8.                  PRIOR MEDICATIONS    Current Outpatient Medications on File Prior to Visit   Medication Sig Dispense Refill    carvedilol (COREG) 25 MG tablet Take 1 tablet by mouth 2 (Two) Times a Day With Meals. 60 tablet 11    furosemide (LASIX) 40 MG tablet Take 1 tablet by mouth Daily. 30 tablet 11    NIFEdipine XL (ADALAT CC) 30 MG 24 hr tablet Take 1 tablet by mouth Daily. 30 tablet 11    O2 (OXYGEN) Inhale 1 (One) Time. Through CPAP      potassium chloride 10 MEQ CR tablet Take 1 tablet by mouth Daily. 30 tablet 11    rosuvastatin (CRESTOR) 10 MG tablet Take 1 tablet by mouth Daily.       No current facility-administered medications on file prior to visit.       ALLERGIES:    Chlorhexidine, Ciprofloxacin, and  "Other    PAST MEDICAL HISTORY:    Past Medical History:   Diagnosis Date    Chronic kidney disease     Diabetes mellitus     2    Hyperlipidemia     Hypertension     Lactose intolerance in adult     Sleep apnea        SURGICAL HISTORY:    Past Surgical History:   Procedure Laterality Date    APPENDECTOMY      APPENDECTOMY      COLONOSCOPY      COLONOSCOPY      KNEE ARTHROSCOPY Right 02/07/2019    Procedure: KNEE ARTHROSCOPY, PARTIAL MEDIAL MENISCECTOMY, PARTIAL LATERAL MENISCECTOMY, MEDIAL CHONDROPLASTY;  Surgeon: Jimmy Zhu MD;  Location: [facility];  Service: Orthopedics    LEG DEBRIDEMENT Left 05/06/2018    Procedure: DEBRIDEMENT LEFT GIANT TOE;  Surgeon: Juancarlos Cortez MD;  Location: [facility];  Service: General       SOCIAL HISTORY:    Social History     Socioeconomic History    Marital status:    Tobacco Use    Smoking status: Never    Smokeless tobacco: Never   Substance and Sexual Activity    Alcohol use: No    Drug use: No    Sexual activity: Defer       FAMILY HISTORY:    Family History   Problem Relation Age of Onset    Cancer Mother     Diabetes Father     Heart disease Brother        Review of Systems   Constitutional: Negative.    HENT: Negative.     Eyes:  Positive for visual disturbance (glasses).   Respiratory: Negative.     Cardiovascular:  Positive for leg swelling. Negative for chest pain and palpitations.   Gastrointestinal: Negative.    Endocrine: Negative.    Genitourinary: Negative.    Musculoskeletal: Negative.    Skin: Negative.    Allergic/Immunologic: Negative.    Neurological: Negative.    Hematological: Negative.    Psychiatric/Behavioral: Negative.         VISIT VITALS:  Vitals:    06/12/24 0901   BP: 101/65   BP Location: Left arm   Patient Position: Sitting   Pulse: 68   SpO2: 93%   Weight: 119 kg (262 lb 9.6 oz)   Height: 185 cm (72.84\")      /65 (BP Location: Left arm, Patient Position: Sitting)   Pulse 68   Ht 185 cm (72.84\")   Wt 119 kg (262 lb 9.6 " oz)   SpO2 93%   BMI 34.80 kg/m²     RECENT LABS:    Objective     ONLY BLE'S ASSESSED    Physical Exam  Musculoskeletal:      Right lower leg: Edema present.      Left lower leg: Edema present.      Comments: LLE, 2-3+ edema to upper calf  RLE, 2+ edema to upper calf  Pedal pulses not assessed         Procedures      Assessment & Plan   #1.  Lower extremity edema.  Symptoms have improved minimally.  There is no obvious single cause.  As blood pressures are well-controlled I would like to trial stopping dihydropyridine calcium channel blocker and patient will continue with sodium restriction and physical measures.  The patient will follow blood pressures at home and will call with results.    2.  Systemic hypertension.  See #1 above.    3.  Small pericardial effusion without evidence of hemodynamic compromise.  And dry skin, cold intolerance, pericardial effusion, and edema I think would be reasonable to empirically treat for hypothyroidism.  We will start levothyroxine 50 mcg daily and the patient will follow labs with Dr. Sunday Key    4.  Dr. 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 Goldstein as instructed and we will plan on seeing him in follow-up in 6 months or on appearing basis as discussed.   Diagnosis Plan   1. Essential hypertension        2. Mixed hyperlipidemia        3. Pericardial effusion        4. HOLLI on CPAP        5. Bilateral leg edema            No follow-ups on file.         Uriah Torres  reports that he has never smoked. He has never used smokeless tobacco. I have educated him on the risk of diseases from using tobacco products such as cancer, COPD, and heart disease.       Advance Care Planning   ACP discussion was held with the patient during this visit. Patient does not have an advance directive, declines further assistance.                            Electronically signed by:    Scribed for Gamal Brasher MD by Sujata Albarran LPN on June 12, 2024  at 09:05 EDT    I, Gamal Brasher MD personally  performed the services described in this documentation as scribed by the above named individual in my presence, and it is both accurate and complete. June 12, 2024 09:05 EDT      Dictated Utilizing Dragon Dictation: Part of this note may be an electronic transcription/translation of spoken language to printed text using the Dragon Dictation System.

## 2024-08-07 ENCOUNTER — TELEPHONE (OUTPATIENT)
Dept: CARDIOLOGY | Facility: CLINIC | Age: 69
End: 2024-08-07
Payer: COMMERCIAL

## 2024-08-07 DIAGNOSIS — E03.9 HYPOTHYROIDISM, UNSPECIFIED TYPE: ICD-10-CM

## 2024-08-07 RX ORDER — LEVOTHYROXINE SODIUM 0.07 MG/1
75 TABLET ORAL DAILY
Qty: 30 TABLET | Refills: 5 | Status: SHIPPED | OUTPATIENT
Start: 2024-08-07

## 2024-08-07 NOTE — TELEPHONE ENCOUNTER
UPDATED SCRIPT SENT TO [facility] AND PATIENT MESSAGED BACK WITH CHANGE. JUNITO CARRANZA        ----- Message from Gamal Brasher sent at 8/7/2024  1:33 PM EDT -----  Regarding: FW: Levothyroxine  Contact: [phone]  Ok to increase to 75 mcg daily  ----- Message -----  From: Sweta Garcia  Sent: 8/6/2024   3:26 PM EDT  To: Gamal Brasher MD; Sujata Albarran LPN  Subject: FW: Levothyroxine                                  ----- Message -----  From: Uriah Torres  Sent: 8/6/2024   3:01 PM EDT  To: Tatiana Brasher [facility]  Subject: Levothyroxine                                    Hi Johan  The levothyroxine 50 mcg that you gave me is really working well but I think I need a little bit more because of the cold intolerance.   Would you mind increasing that to 75 mcg  Qday with 11 refills?  I don’t see my primary care until October.  If not, that’s totally fine.  Hope everything is going well!!  Take care  Uriah

## 2024-09-17 DIAGNOSIS — I10 ESSENTIAL HYPERTENSION: Primary | ICD-10-CM

## 2024-09-17 RX ORDER — LOSARTAN POTASSIUM 50 MG/1
1 TABLET ORAL DAILY
COMMUNITY
Start: 2024-09-09 | End: 2024-09-17 | Stop reason: SDUPTHER

## 2024-09-17 RX ORDER — LOSARTAN POTASSIUM 50 MG/1
50 TABLET ORAL DAILY
Qty: 90 TABLET | Refills: 3 | Status: SHIPPED | OUTPATIENT
Start: 2024-09-17 | End: 2024-09-19 | Stop reason: SDUPTHER

## 2024-09-19 DIAGNOSIS — I10 ESSENTIAL HYPERTENSION: ICD-10-CM

## 2024-09-19 RX ORDER — LOSARTAN POTASSIUM 50 MG/1
50 TABLET ORAL DAILY
Qty: 90 TABLET | Refills: 3 | Status: SHIPPED | OUTPATIENT
Start: 2024-09-19

## 2024-12-10 ENCOUNTER — TELEPHONE (OUTPATIENT)
Dept: CARDIOLOGY | Facility: CLINIC | Age: 69
End: 2024-12-10

## 2024-12-10 NOTE — TELEPHONE ENCOUNTER
Caller: Uriah Torres    Relationship to patient: Self    Best call back number: [phone]    Chief complaint: HAD TO BE OUT OF TOWN AND CANCELED HIS APPT ON 12.17.24.     Type of visit: 6 MONTH FU    Requested date: ROUGHLY AFTER THE 17TH     If rescheduling, when is the original appointment: 12.17.24

## 2024-12-14 DIAGNOSIS — E03.9 HYPOTHYROIDISM, UNSPECIFIED TYPE: ICD-10-CM

## 2024-12-17 RX ORDER — LEVOTHYROXINE SODIUM 75 UG/1
75 TABLET ORAL DAILY
Qty: 90 TABLET | Refills: 0 | Status: SHIPPED | OUTPATIENT
Start: 2024-12-17

## 2025-01-19 DIAGNOSIS — E03.9 HYPOTHYROIDISM, UNSPECIFIED TYPE: ICD-10-CM

## 2025-01-20 RX ORDER — LEVOTHYROXINE SODIUM 75 UG/1
75 TABLET ORAL DAILY
Qty: 90 TABLET | Refills: 1 | Status: SHIPPED | OUTPATIENT
Start: 2025-01-20 | End: 2025-01-23 | Stop reason: SDUPTHER

## 2025-01-20 NOTE — TELEPHONE ENCOUNTER
Name from pharmacy: Levothyroxine Sodium 75 MCG Oral Tablet         Will file in chart as: levothyroxine (SYNTHROID, LEVOTHROID) 75 MCG tablet    Sig: Take 1 tablet by mouth once daily    Disp: 90 tablet    Refills: 0    Start: 1/19/2025    Class: Normal    Non-formulary For: Hypothyroidism, unspecified type    Last ordered: 1 month ago (12/17/2024) by Gamal Brasher MD    Last refill: 11/1/2024    Rx #: [id number]    Thyroid Hormones Protocol [id number]/19/2025 11:53 AM   Protocol Details Normal TSH in past 12 months    Recent or future visit with authorizing provider

## 2025-01-21 ENCOUNTER — TELEPHONE (OUTPATIENT)
Dept: CARDIOLOGY | Facility: CLINIC | Age: 70
End: 2025-01-21
Payer: COMMERCIAL

## 2025-01-21 DIAGNOSIS — E03.9 HYPOTHYROIDISM, UNSPECIFIED TYPE: ICD-10-CM

## 2025-01-21 NOTE — TELEPHONE ENCOUNTER
Patient is working two jobs, he had to cancel and reschedule his appointment. Patient is out of town for work.

## 2025-01-23 RX ORDER — LEVOTHYROXINE SODIUM 75 UG/1
75 TABLET ORAL DAILY
Qty: 90 TABLET | Refills: 1 | Status: SHIPPED | OUTPATIENT
Start: 2025-01-23

## 2025-03-17 ENCOUNTER — OFFICE VISIT (OUTPATIENT)
Dept: CARDIOLOGY | Facility: CLINIC | Age: 70
End: 2025-03-17
Payer: COMMERCIAL

## 2025-03-17 VITALS — SYSTOLIC BLOOD PRESSURE: 128 MMHG | HEART RATE: 59 BPM | OXYGEN SATURATION: 97 % | DIASTOLIC BLOOD PRESSURE: 83 MMHG

## 2025-03-17 DIAGNOSIS — I31.39 PERICARDIAL EFFUSION: ICD-10-CM

## 2025-03-17 DIAGNOSIS — E78.2 MIXED HYPERLIPIDEMIA: ICD-10-CM

## 2025-03-17 DIAGNOSIS — I10 ESSENTIAL HYPERTENSION: Primary | ICD-10-CM

## 2025-03-17 DIAGNOSIS — G47.33 OSA ON CPAP: ICD-10-CM

## 2025-03-17 DIAGNOSIS — E11.9 TYPE 2 DIABETES MELLITUS WITHOUT COMPLICATION, WITHOUT LONG-TERM CURRENT USE OF INSULIN: ICD-10-CM

## 2025-03-17 DIAGNOSIS — E03.9 HYPOTHYROIDISM, UNSPECIFIED TYPE: ICD-10-CM

## 2025-03-17 PROCEDURE — 99214 OFFICE O/P EST MOD 30 MIN: CPT | Performed by: SPECIALIST

## 2025-03-17 RX ORDER — CARVEDILOL 25 MG/1
25 TABLET ORAL 2 TIMES DAILY WITH MEALS
Qty: 180 TABLET | Refills: 1 | Status: SHIPPED | OUTPATIENT
Start: 2025-03-17

## 2025-03-17 RX ORDER — LEVOTHYROXINE SODIUM 88 UG/1
88 TABLET ORAL DAILY
Qty: 90 TABLET | Refills: 1 | Status: SHIPPED | OUTPATIENT
Start: 2025-03-17 | End: 2025-03-17 | Stop reason: SDUPTHER

## 2025-03-17 RX ORDER — LEVOTHYROXINE SODIUM 88 UG/1
88 TABLET ORAL DAILY
Qty: 90 TABLET | Refills: 1 | Status: SHIPPED | OUTPATIENT
Start: 2025-03-17

## 2025-03-17 RX ORDER — ROSUVASTATIN CALCIUM 10 MG/1
10 TABLET, COATED ORAL DAILY
Qty: 90 TABLET | Refills: 1 | Status: SHIPPED | OUTPATIENT
Start: 2025-03-17

## 2025-03-17 RX ORDER — LOSARTAN POTASSIUM 50 MG/1
50 TABLET ORAL DAILY
Qty: 90 TABLET | Refills: 3 | Status: SHIPPED | OUTPATIENT
Start: 2025-03-17

## 2025-03-17 NOTE — PROGRESS NOTES
Subjective   Follow up, HTN  Uriah Torres is a 69 y.o. male who presents to day for Essential hypertension.    CHIEF COMPLIANT  Chief Complaint   Patient presents with    Essential hypertension       Active Problems:  Problem List Items Addressed This Visit          Cardiac and Vasculature    Essential hypertension - Primary    Relevant Medications    carvedilol (COREG) 25 MG tablet    losartan (COZAAR) 50 MG tablet    Hyperlipidemia    Relevant Medications    rosuvastatin (CRESTOR) 10 MG tablet    Other Relevant Orders    Lipid Panel    Comprehensive Metabolic Panel    Pericardial effusion    Relevant Orders    TSH    Adult Transthoracic Echo Limited W/ Cont if Necessary Per Protocol       Endocrine and Metabolic    Type 2 diabetes mellitus    Relevant Orders    Hemoglobin A1c       Sleep    HOLLI on CPAP     Other Visit Diagnoses         Hypothyroidism, unspecified type        Relevant Medications    carvedilol (COREG) 25 MG tablet    levothyroxine (SYNTHROID, LEVOTHROID) 88 MCG tablet            HPI  HPI    History of Present Illness  The patient presents for evaluation of hypertension, hypothyroidism, hyperlipidemia, chronic kidney disease, and pericardial effusion.    He has been diligently monitoring his blood pressure since November 2024, with readings consistently within the normal range. He reports no symptoms of shortness of breath or chest pain.    He continues to use a CPAP machine at night, under the supervision of Jackie.    He is currently on a regimen of rosuvastatin for cholesterol management.    He has been diagnosed with chronic kidney disease, which he attributes to a previous COVID-19 infection. He is under the care of Dr. Broussard for this condition. Despite a comprehensive workup for glomerulonephritis yielding negative results, he remains concerned about his chronic kidney disease.    He experienced severe cold intolerance during the summer, which was alleviated by an increase in his  levothyroxine dosage from 50 mcg to 75 mcg. However, he has recently noticed a recurrence of cold intolerance and is requesting an increase in his levothyroxine dosage to 88 mcg. His TSH level was recorded as 1.55 in October 2024.    He is not currently monitoring his blood glucose levels but believes that his ongoing weight loss is contributing to improved control. He was previously prescribed Mounjaro by Dr. Torres, but discontinued it due to cost and associated constipation.    He had a mild pericardial effusion and ankle swelling, which resolved with Lasix and potassium. He is no longer taking potassium as advised by Dr. Broussard.    MEDICATIONS  Current: Rosuvastatin, carvedilol, levothyroxine, CPAP  Discontinued: Lasix, potassium, Mounjaro       PRIOR MEDS  Current Outpatient Medications on File Prior to Visit   Medication Sig Dispense Refill    O2 (OXYGEN) Inhale 1 (One) Time. Through CPAP      [DISCONTINUED] carvedilol (COREG) 25 MG tablet Take 1 tablet by mouth 2 (Two) Times a Day With Meals. 60 tablet 11    [DISCONTINUED] levothyroxine (SYNTHROID, LEVOTHROID) 75 MCG tablet Take 1 tablet by mouth Daily. 90 tablet 1    [DISCONTINUED] losartan (COZAAR) 50 MG tablet Take 1 tablet by mouth Daily. 90 tablet 3    [DISCONTINUED] rosuvastatin (CRESTOR) 10 MG tablet Take 1 tablet by mouth Daily.      [DISCONTINUED] furosemide (LASIX) 40 MG tablet Take 1 tablet by mouth Daily. 30 tablet 11    [DISCONTINUED] potassium chloride 10 MEQ CR tablet Take 1 tablet by mouth Daily. 30 tablet 11     No current facility-administered medications on file prior to visit.       ALLERGIES  Chlorhexidine, Ciprofloxacin, and Other    HISTORY  Past Medical History:   Diagnosis Date    Abnormal ECG 3-    Arrhythmia     Chronic kidney disease     Diabetes mellitus     2    Hyperlipidemia     Hypertension     Lactose intolerance in adult     Sleep apnea        Social History     Socioeconomic History    Marital status:     Tobacco Use    Smoking status: Never    Smokeless tobacco: Never   Vaping Use    Vaping status: Never Used   Substance and Sexual Activity    Alcohol use: Never    Drug use: Never    Sexual activity: Yes     Partners: Female     Birth control/protection: None     Comment: , six kids       Family History   Problem Relation Age of Onset    Cancer Mother     Diabetes Father     Heart disease Brother         MI       Review of Systems   Constitutional: Negative.    HENT: Negative.     Eyes: Negative.    Respiratory: Negative.     Cardiovascular: Negative.    Gastrointestinal: Negative.    Endocrine: Negative.    Genitourinary: Negative.    Musculoskeletal: Negative.    Skin: Negative.    Allergic/Immunologic: Negative.    Neurological: Negative.    Hematological: Negative.    Psychiatric/Behavioral: Negative.         Objective     VITALS: /83 (BP Location: Left arm, Patient Position: Sitting, Cuff Size: Adult)   Pulse 59   SpO2 97%     LABS:   Lab Results (most recent)       None            IMAGING:   No Images in the past 120 days found..    EXAM:  Physical Exam  Constitutional:       Appearance: He is well-developed.   HENT:      Head: Normocephalic and atraumatic.   Eyes:      Pupils: Pupils are equal, round, and reactive to light.   Neck:      Thyroid: No thyromegaly.      Vascular: No JVD.   Cardiovascular:      Rate and Rhythm: Normal rate and regular rhythm.      Chest Wall: PMI is not displaced.      Pulses: Normal pulses.      Heart sounds: Normal heart sounds, S1 normal and S2 normal. No murmur heard.     No friction rub. No gallop.      Comments: Trace edema  Pulmonary:      Effort: Pulmonary effort is normal. No respiratory distress.      Breath sounds: Normal breath sounds. No stridor. No wheezing or rales.   Chest:      Chest wall: No tenderness.   Abdominal:      General: Bowel sounds are normal. There is no distension.      Palpations: Abdomen is soft. There is no mass.      Tenderness:  There is no abdominal tenderness. There is no guarding or rebound.   Musculoskeletal:      Cervical back: Neck supple. No edema.   Skin:     General: Skin is warm and dry.      Coloration: Skin is not pale.      Findings: No erythema or rash.   Neurological:      Mental Status: He is alert and oriented to person, place, and time.      Cranial Nerves: No cranial nerve deficit.      Coordination: Coordination normal.   Psychiatric:         Behavior: Behavior normal.       Physical Exam  Lungs are clear.  Heart sounds are normal.    Vital Signs  Blood pressure is 128/83.       Procedure   Procedures      Results  Laboratory Studies  TSH was 1.55 in October 2024.       Assessment & Plan     Diagnoses and all orders for this visit:    1. Essential hypertension (Primary)  -     carvedilol (COREG) 25 MG tablet; Take 1 tablet by mouth 2 (Two) Times a Day With Meals.  Dispense: 180 tablet; Refill: 1  -     losartan (COZAAR) 50 MG tablet; Take 1 tablet by mouth Daily.  Dispense: 90 tablet; Refill: 3    2. Pericardial effusion  -     TSH; Future  -     Adult Transthoracic Echo Limited W/ Cont if Necessary Per Protocol; Future    3. Mixed hyperlipidemia  -     Lipid Panel; Future  -     Comprehensive Metabolic Panel; Future  -     rosuvastatin (CRESTOR) 10 MG tablet; Take 1 tablet by mouth Daily.  Dispense: 90 tablet; Refill: 1    4. Type 2 diabetes mellitus without complication, without long-term current use of insulin  -     Hemoglobin A1c; Future    5. HOLLI on CPAP    6. Hypothyroidism, unspecified type  -     Discontinue: levothyroxine (SYNTHROID, LEVOTHROID) 88 MCG tablet; Take 1 tablet by mouth Daily.  Dispense: 90 tablet; Refill: 1  -     levothyroxine (SYNTHROID, LEVOTHROID) 88 MCG tablet; Take 1 tablet by mouth Daily.  Dispense: 90 tablet; Refill: 1      Assessment & Plan  1. Hypertension.  His blood pressure is well-regulated at 128/83. He will maintain his current medication regimen, including carvedilol, with all  necessary refills provided.    2. Hypothyroidism.  His levothyroxine dosage will be increased to 88 mcg. A prescription for levothyroxine 88 mcg will be sent to [facility]. His TSH levels will be monitored before the next visit.    3. Hyperlipidemia.  He will continue his rosuvastatin therapy. A lipid panel will be conducted prior to the next visit.    4. Chronic kidney disease.  His chronic kidney disease is stable and monitored by Dr. Broussard.    5. Pericardial effusion.  An echocardiogram will be repeated to assess the status of the pericardial effusion.    6. Diabetes mellitus.  He is not currently monitoring his blood sugar levels but is losing weight, which is beneficial. Hemoglobin A1c will be checked to assess his blood sugar control.    Follow-up  The patient will follow up in 6 months.       Return in about 6 months (around 9/17/2025).      Advance Care Planning   ACP discussion was held with the patient during this visit. Patient does not have an advance directive, declines further assistance.             MEDS ORDERED DURING VISIT:  New Medications Ordered This Visit   Medications    carvedilol (COREG) 25 MG tablet     Sig: Take 1 tablet by mouth 2 (Two) Times a Day With Meals.     Dispense:  180 tablet     Refill:  1     Increase to 25 mg bid    levothyroxine (SYNTHROID, LEVOTHROID) 88 MCG tablet     Sig: Take 1 tablet by mouth Daily.     Dispense:  90 tablet     Refill:  1    losartan (COZAAR) 50 MG tablet     Sig: Take 1 tablet by mouth Daily.     Dispense:  90 tablet     Refill:  3    rosuvastatin (CRESTOR) 10 MG tablet     Sig: Take 1 tablet by mouth Daily.     Dispense:  90 tablet     Refill:  1     Uriah Torres  reports that he has never smoked. He has never used smokeless tobacco.         As always, Melissa Goldstein MD  I appreciate very much the opportunity to participate in the cardiovascular care of your patients. Please do not hesitate to call me with any questions with regards to Uriah  Melissa evaluation and management.     Patient or patient representative verbalized consent for the use of Ambient Listening during the visit with  Jose Goodwin MD for chart documentation. 3/17/2025  11:34 EDT       This document has been electronically signed by Jose Goodwin MD  March 17, 2025 12:18 EDT    This note is dictated utilizing voice recognition software.

## 2025-04-18 ENCOUNTER — HOSPITAL ENCOUNTER (OUTPATIENT)
Dept: CARDIOLOGY | Facility: HOSPITAL | Age: 70
Discharge: HOME | End: 2025-04-18
Admitting: SPECIALIST
Payer: COMMERCIAL

## 2025-04-18 DIAGNOSIS — I31.39 PERICARDIAL EFFUSION: ICD-10-CM

## 2025-04-18 LAB
BH CV ECHO LEFT VENTRICLE GLOBAL LONGITUDINAL STRAIN: -18.5 %
BH CV ECHO MEAS - ACS: 1.66 CM
BH CV ECHO MEAS - AO ROOT DIAM: 3.4 CM
BH CV ECHO MEAS - EDV(CUBED): 77.3 ML
BH CV ECHO MEAS - EDV(MOD-SP4): 133 ML
BH CV ECHO MEAS - EF(MOD-SP4): 80.8 %
BH CV ECHO MEAS - ESV(CUBED): 14.9 ML
BH CV ECHO MEAS - ESV(MOD-SP4): 25.6 ML
BH CV ECHO MEAS - FS: 42.3 %
BH CV ECHO MEAS - IVS/LVPW: 1.12 CM
BH CV ECHO MEAS - IVSD: 1.61 CM
BH CV ECHO MEAS - LA DIMENSION: 4.7 CM
BH CV ECHO MEAS - LV DIASTOLIC VOL/BSA (35-75): 55.7 CM2
BH CV ECHO MEAS - LV MASS(C)D: 261.3 GRAMS
BH CV ECHO MEAS - LV SYSTOLIC VOL/BSA (12-30): 10.7 CM2
BH CV ECHO MEAS - LVIDD: 4.3 CM
BH CV ECHO MEAS - LVIDS: 2.46 CM
BH CV ECHO MEAS - LVPWD: 1.44 CM
BH CV ECHO MEAS - RVDD: 4 CM
BH CV ECHO MEAS - SV(MOD-SP4): 107.4 ML
BH CV ECHO MEAS - SVI(MOD-SP4): 44.9 ML/M2
LEFT ATRIUM VOLUME INDEX: 26.5 ML/M2
LV EF 3D SEGMENTATION: 73 %

## 2025-04-18 PROCEDURE — 93308 TTE F-UP OR LMTD: CPT

## 2025-04-18 PROCEDURE — 93356 MYOCRD STRAIN IMG SPCKL TRCK: CPT

## 2025-06-02 DIAGNOSIS — E11.9 TYPE 2 DIABETES MELLITUS WITHOUT COMPLICATION, WITHOUT LONG-TERM CURRENT USE OF INSULIN: ICD-10-CM

## 2025-06-02 DIAGNOSIS — E78.2 MIXED HYPERLIPIDEMIA: ICD-10-CM

## (undated) DEVICE — WRP COMPR KN COLD UNIV

## (undated) DEVICE — BNDG ELAS CO-FLEX SLF ADHR 6IN 5YD LF STRL

## (undated) DEVICE — BANDAGE,GAUZE,BULKEE II,2.25"X3YD,STRL: Brand: MEDLINE

## (undated) DEVICE — SUT ETHLN 3-0 FS118IN 663H

## (undated) DEVICE — BNDG ELAS CO-FLEX SLF ADHR 2IN 5YD LF STRL

## (undated) DEVICE — SPNG GZ STRL 2S 4X4 12PLY

## (undated) DEVICE — BNDG ELAS CO-FLEX SLF ADHR 4IN5YD LF STRL

## (undated) DEVICE — NDL HYPO ECLPS SFTY 18G 1 1/2IN

## (undated) DEVICE — TRY SKINPREP PVP SCRB W PAINT

## (undated) DEVICE — CURITY AMD ANTIMICROBIAL PACKING STRIPS: Brand: CURITY

## (undated) DEVICE — PK BASIC 70

## (undated) DEVICE — NDL SPINE 22G 31/2IN BLK

## (undated) DEVICE — ENCORE® LATEX MICRO SIZE 7.5, STERILE LATEX POWDER-FREE SURGICAL GLOVE: Brand: ENCORE

## (undated) DEVICE — NDL HYPO ECLPS SFTY 22G 1 1/2IN

## (undated) DEVICE — TBG PUMP ARTHSCP MAIN AR6400 16FT

## (undated) DEVICE — PAD GRND REM POLYHESIVE A/ DISP

## (undated) DEVICE — SHEET,DRAPE,53X77,STERILE: Brand: MEDLINE

## (undated) DEVICE — BLD CUT FORMLA AGGR PLS 4.0MM

## (undated) DEVICE — ENCORE® LATEX MICRO SIZE 8, STERILE LATEX POWDER-FREE SURGICAL GLOVE: Brand: ENCORE

## (undated) DEVICE — HOLDER: Brand: DEROYAL

## (undated) DEVICE — DRSNG WND GZ CURAD OIL EMULSION 3X8IN LF STRL 1PK

## (undated) DEVICE — DRSNG WND STRIP OPTIFOAM AG A/MIC LF 3.5X6IN STRL

## (undated) DEVICE — DISPOSABLE TOURNIQUET CUFF SINGLE BLADDER, SINGLE PORT AND LUER LOCK CONNECTOR: Brand: COLOR CUFF

## (undated) DEVICE — APPL CHLORAPREP W/TINT 26ML ORNG

## (undated) DEVICE — DRAPE,EXTREMITY,89X128,STERILE: Brand: MEDLINE

## (undated) DEVICE — PK KN ARTHSCP 70

## (undated) DEVICE — INTENDED FOR TISSUE SEPARATION, AND OTHER PROCEDURES THAT REQUIRE A SHARP SURGICAL BLADE TO PUNCTURE OR CUT.: Brand: BARD-PARKER ® STAINLESS STEEL BLADES